# Patient Record
Sex: FEMALE | Race: WHITE | Employment: OTHER | URBAN - METROPOLITAN AREA
[De-identification: names, ages, dates, MRNs, and addresses within clinical notes are randomized per-mention and may not be internally consistent; named-entity substitution may affect disease eponyms.]

---

## 2017-01-16 ENCOUNTER — HOSPITAL ENCOUNTER (OUTPATIENT)
Dept: MAMMOGRAPHY | Age: 67
Discharge: HOME | End: 2017-01-16
Attending: INTERNAL MEDICINE
Payer: MEDICARE

## 2017-01-16 DIAGNOSIS — Z78.0 POSTMENOPAUSAL: ICD-10-CM

## 2017-01-16 PROCEDURE — 77080 DXA BONE DENSITY AXIAL: CPT

## 2017-09-11 ENCOUNTER — HOSPITAL ENCOUNTER (OUTPATIENT)
Dept: PHYSICAL THERAPY | Age: 67
Discharge: HOME | End: 2017-09-11
Attending: INTERNAL MEDICINE
Payer: MEDICARE

## 2017-09-11 PROCEDURE — 97110 THERAPEUTIC EXERCISES: CPT

## 2017-09-11 PROCEDURE — 97166 OT EVAL MOD COMPLEX 45 MIN: CPT

## 2017-09-11 PROCEDURE — G8985 CARRY GOAL STATUS: HCPCS

## 2017-09-11 PROCEDURE — G8984 CARRY CURRENT STATUS: HCPCS

## 2017-09-11 NOTE — PROGRESS NOTES
Ambulatory/Rehab Services H2 Model Falls Risk Assessment    Risk Factor Pts. ·   Confusion/Disorientation/Impulsivity  []    4 ·   Symptomatic Depression  []   2 ·   Altered Elimination  []   1 ·   Dizziness/Vertigo  []   1 ·   Gender (Male)  []   1 ·   Any administered antiepileptics (anticonvulsants):  []   2 ·   Any administered benzodiazepines:  []   1 ·   Visual Impairment (specify):  []   1 ·   Portable Oxygen Use  []   1 ·   Orthostatic ? BP  []   1 ·   History of Recent Falls (within 3 mos.)  []   5     Ability to Rise from Chair (choose one) Pts. ·   Ability to rise in a single movement  []   0 ·   Pushes up, successful in one attempt  []   1 ·   Multiple attempts, but successful  []   3 ·   Unable to rise without assistance  []   4   Total: (5 or greater = High Risk) 0     Falls Prevention Plan:   []                Physical Limitations to Exercise (specify):   []                Mobility Assistance Device (type):   []                Exercise/Equipment Adaptation (specify):    ©2010 [facility] of [address] Patent #[id number].  Federal Law prohibits the replication, distribution or use without written permission from [facility] [employer]

## 2017-09-11 NOTE — PROGRESS NOTES
Reema Andino  : 1950 [facility] at [facility]  [address], [facility], [address]  [phone]   [phone]       Reema Andino  : 1950 Therapy Center at [facility]  [address], [address], [address]  [phone]   [phone]         OUTPATIENT OCCUPATIONAL THERAPY: Initial Assessment and Daily Note 2017    ICD-10: Treatment Diagnosis:   I69.952 Hemiplegia and hemiparesis following unspecified cerebrovascular disease affecting left dominant side       Precautions/Allergies:   Review of patient's allergies indicates no known allergies. Fall Risk Score: 0 (? 5 = High Risk)  MD Orders: OT to address LUE weakness secondary to CVA MEDICAL/REFERRING DIAGNOSIS:   Weakness due to cerebrovascular accident (CVA) ([facility] [address] [I63.9, R53.1]   DATE OF ONSET: 2017   REFERRING PHYSICIAN: Arti Jean Baptiste MD  RETURN PHYSICIAN APPOINTMENT: 2018     INITIAL ASSESSMENT:  Ms. Norma Pulido presents with decreased functional use, strength and range of motion of her left upper extremity that is affecting her independence with activities of daily living and ability to perform job tasks. I feel that Ms. Norma Pulido will benefit from skilled occupational therapy to maximize the functional use of her upper extremity in daily activities and work tasks. PLAN OF CARE:   PROBLEM LIST:  1. Decreased strength in LUE/hand  2. Decreased fine motor coordination in LUE/hand  3. Decreased functional use of LUE for functional tasks INTERVENTIONS PLANNED:  1. Modalities that may include fluidotherapy, paraffin, ultrasound, and light therapy. 2. Therapeutic exercise including a home exercise program.  3. Manual therapy. 4. Therapeutic activities. TREATMENT PLAN:  Effective Dates: 2017 TO 2017.   Frequency/Duration: 2 times a week for 8 weeks  GOALS: (Goals have been discussed and agreed upon with patient.)  Short-Term Functional Goals: Time Frame: 4 weeks  1. Increase LUE strength to 4/5 to allow improved functional use of LUE for tasks such as cutting fabric/gardening  2. Patient to be independent in [facility] for strengthening/coordination of LUE  3. Increase strength in L  by 10 pounds to allow patient to  and lift objects during self care activities. Discharge Goals: Time Frame: 8 weeks  1. Improve DASH rating by 6-8 points indicating overall improved functional use of LUE for self care/homeaking/hobbies  2. Increase motion in L [facility]le [facility] to allow patient to perform all ADL activities. 3. Increase strength in L   by 15 pounds to allow patient to , lift, hold, and carry heavy objects. Rehabilitation Potential For Stated Goals: Excellent  Regarding Brit Caballero's therapy, I certify that the treatment plan above will be carried out by a therapist or under their direction. Thank you for this referral,  Ashley Payne, OT       Referring Physician Signature: Arti Jean Baptiste MD _________________________  Date _________            The information in this section was collected on 9/11/2017 (except where otherwise noted). OCCUPATIONAL PROFILE & HISTORY:   History of Present Injury/Illness (Reason for Referral):  Patient report she had a probable R CVA on 8/29/2017 while on a cruise ship in [place]. She was taken to the [place] [address] and stayed there for 4 days until she returned to the [facility] on 9/2/2017. Since then she reports decreased hand/shoulder strength making it difficult for her to open containers, cut fabric and garden all of which she routinely performs. She has an MRI and US scheduled for 9/21/2017 to confirm CVA. Patient is also set to begin PT to address LLE weakness. Since this incident she uses a straight cane for ambulation and did not use one prior.   Past Medical History/Comorbidities:     Ms. Norma Pulido  has a past medical history of Hypertension (1/8/2013) and Hypothyroidism (1/8/2013). Ms. Reuben Delgado  has a past surgical history that includes hysterectomy; cholecystectomy (2001); and tonsillectomy. Social History/Living Environment:    Patient lives with her  in a 1-story home. She and  travel frequently. Prior Level of Function/Work/Activity:  Retired   Dominant Side:         LEFT  Personal Factors:          Sex:  female        Age:  77 y.o. Current Medications:    Current Outpatient Prescriptions:     benzonatate (TESSALON) 100 mg capsule, Take 1 Cap by mouth three (3) times daily as needed for Cough. , Disp: 30 Cap, Rfl: 2    azithromycin (ZITHROMAX) 250 mg tablet, Take 1 Tab by mouth See Admin Instructions for 5 days. , Disp: 6 Tab, Rfl: 0    albuterol (PROVENTIL HFA, VENTOLIN HFA, PROAIR HFA) 90 mcg/actuation inhaler, Take 2 Puffs by inhalation every four (4) hours as needed for Wheezing (cough). , Disp: 1 Inhaler, Rfl: 0    zolpidem (AMBIEN) 10 mg tablet, Take 1 Tab by mouth nightly as needed. , Disp: 90 Tab, Rfl: 1    SYNTHROID 100 mcg tablet, TAKE 1 TABLET DAILY BEFORE BREAKFAST, Disp: 90 Tab, Rfl: 3    cholecalciferol (VITAMIN D3) 1,000 unit tablet, Take  by mouth daily. , Disp: , Rfl:     raloxifene (EVISTA) 60 mg tablet, TAKE 1 TABLET DAILY, Disp: 90 Tab, Rfl: 3    cyclobenzaprine (FLEXERIL) 10 mg tablet, Take 1 Tab by mouth three (3) times daily as needed for Muscle Spasm(s). , Disp: 30 Tab, Rfl: 1    HYDROcodone-acetaminophen (NORCO) 5-325 mg per tablet, Take 1 Tab by mouth every six (6) hours as needed for Pain. Max Daily Amount: 4 Tabs., Disp: 20 Tab, Rfl: 0    valsartan-hydroCHLOROthiazide (DIOVAN-HCT) 160-25 mg per tablet, TAKE 1 TABLET DAILY, Disp: 90 Tab, Rfl: 3    GLUCOSAMINE HCL/CHONDR PÉREZ A NA (GLUCOSAMINE-CHONDROITIN) 750-600 mg tab, Take  by mouth., Disp: , Rfl:     alclometasone (ACLOVATE) 0.05 % topical cream, Apply  to affected area two (2) times a day.  apply thin layer as directed, Disp: 60 g, Rfl: 0    aspirin 81 mg tablet, Take 81 mg by mouth. Indications: taking 3 tablets. , Disp: , Rfl:     calcium 500 mg Tab, Take  by mouth., Disp: , Rfl:     potassium 99 mg tablet, Take 99 mg by mouth daily as needed. , Disp: , Rfl:    Date Last Reviewed:  9/11/2017   Number of medical conditions (excluding presenting problem) that affect the Plan of Care: Expanded review of therapy/medical records (1-2):  MODERATE COMPLEXITY   ASSESSMENT OF OCCUPATIONAL PERFORMANCE:   RANGE OF MOTION:     · AROM: RUE WFL  ·             LUE WFL except shoulder flexion/ABD to 90 degrees           · PROM: WFL BUE's          STRENGTH:  RUE 4+/5                      LUE shoulder girdle 3-/5, elbow/forearm 4-/5                          R 64#   L 29#                      Tip   R 8#    L 9#                      Lat. Pinch  R 11#    L  4#                      3 jaw amadou   R 12#   L10#      SENSATION:  Intact BUE's    COORDINATION:  9 hole peg test   R 23 seconds    L 22 seconds                              Patient has difficulty opening tight jars, cutting fabric, garden and cut food using L hand. She is otherwise independent in self care skills. Physical Skills Involved:  1. Range of Motion  2. Strength  3. Fine Motor Control Cognitive Skills Affected (resulting in the inability to perform in a timely and safe manner):  1. Psychosocial Skills Affected:  1. Habits/Routines   Number of elements that affect the Plan of Care: 3-5:  MODERATE COMPLEXITY   CLINICAL DECISION MAKING:   Outcome Measure: Tool Used: Disabilities of the Arm, Shoulder and Hand (DASH) Questionnaire - Quick Version  Score:  Initial: 25/55  Most Recent: X/55 (Date: -- )   Interpretation of Score: The DASH is designed to measure the activities of daily living in person's with upper extremity dysfunction or pain. Each section is scored on a 1-5 scale, 5 representing the greatest disability. The scores of each section are added together for a total score of 55.     Score 11 12-19 20-28 29-37 38-45 46-54 55   Modifier CH CI CJ CK CL CM CN     ? Carrying, Moving, and Handling Objects:     - CURRENT STATUS: CJ - 20%-39% impaired, limited or restricted    - GOAL STATUS: CI - 1%-19% impaired, limited or restricted    - D/C STATUS:  ---------------To be determined---------------      Medical Necessity:   · Skilled intervention continues to be required due to LUE weakness as a result of a possible CVA. Reason for Services/Other Comments:  · Patient continues to require skilled intervention due to patient having difficulty completing functional tasks as a result of weakness in L dominant UE secondary to probable CVA. Clinical Decision-Making: MODERATE COMPLEXITY   TREATMENT:   (In addition to Assessment/Re-Assessment sessions the following treatments were rendered)  Pre-treatment Symptoms/Complaints:  LUE weakness and ankle weakness  Pain: Initial: Pain Intensity 1: 1  Post Session:  1:1     OT evaluation completed. Therapeutic exercise (30 minutes):  Patient completed graded clothespins for hand/shoulder strengthening, velcro board, gripper for 20 reps, theraputty and card shuffling for coordination. Patient issued theraputty and gripper for HEP for strengthening/coordination along with fine motor activities (cutting paper, wringing out wash cloth, spray bottle and washing wallls, etc) to all perform daily using LUE. Treatment/Session Assessment:    · Response to Treatment:  Patients tolerated treatment well with no complications. Patient agrees with treatment plan established today and seems eager to see improvements in her condition. · Compliance with Program/Exercises: Will assess as treatment progresses. · Recommendations/Intent for next treatment session: \"Next visit will focus on advancements to more challenging activities\".   Total Treatment Duration:  OT Patient Time In/Time Out  Time In: 0800  Time Out: 0900    Kale Dooley OT

## 2017-09-14 ENCOUNTER — HOSPITAL ENCOUNTER (OUTPATIENT)
Dept: PHYSICAL THERAPY | Age: 67
Discharge: HOME | End: 2017-09-14
Attending: INTERNAL MEDICINE
Payer: MEDICARE

## 2017-09-14 PROCEDURE — 97110 THERAPEUTIC EXERCISES: CPT

## 2017-09-14 PROCEDURE — 97530 THERAPEUTIC ACTIVITIES: CPT

## 2017-09-14 NOTE — PROGRESS NOTES
Ravin Bae  : 1950 Therapy [facility] at [facility]  [address], [facility], [address]  [phone]   [phone]       Ravin Bae  : 1950 [facility] at [facility]  [address], [address], [facility] [address]  [phone]   [phone]         OUTPATIENT OCCUPATIONAL THERAPY: Daily Note 2017    ICD-10: Treatment Diagnosis:   I69.952 Hemiplegia and hemiparesis following unspecified cerebrovascular disease affecting left dominant side       Precautions/Allergies:   Review of patient's allergies indicates no known allergies. Fall Risk Score: 0 (? 5 = High Risk)  MD Orders: OT to address LUE weakness secondary to CVA MEDICAL/REFERRING DIAGNOSIS:   Lymphedema, not elsewhere classified [I89.0]   DATE OF ONSET: 2017   REFERRING PHYSICIAN: Juana Ovalles MD  RETURN PHYSICIAN APPOINTMENT: 2018     INITIAL ASSESSMENT:  Ms. Javier Bertrand presents with decreased functional use, strength and range of motion of her left upper extremity that is affecting her independence with activities of daily living and ability to perform job tasks. I feel that Ms. Javier Bertrand will benefit from skilled occupational therapy to maximize the functional use of her upper extremity in daily activities and work tasks. PLAN OF CARE:   PROBLEM LIST:  1. Decreased strength in LUE/hand  2. Decreased fine motor coordination in LUE/hand  3. Decreased functional use of LUE for functional tasks INTERVENTIONS PLANNED:  1. Modalities that may include fluidotherapy, paraffin, ultrasound, and light therapy. 2. Therapeutic exercise including a home exercise program.  3. Manual therapy. 4. Therapeutic activities. TREATMENT PLAN:  Effective Dates: 2017 TO 2017.   Frequency/Duration: 2 times a week for 8 weeks  GOALS: (Goals have been discussed and agreed upon with patient.)  Short-Term Functional Goals: Time Frame: 4 weeks  1. Increase LUE strength to 4/5 to allow improved functional use of LUE for tasks such as cutting fabric/gardening  2. Patient to be independent in [facility] for strengthening/coordination of LUE  3. Increase strength in L  by 10 pounds to allow patient to  and lift objects during self care activities. Discharge Goals: Time Frame: 8 weeks  1. Improve DASH rating by 6-8 points indicating overall improved functional use of LUE for self care/homeaking/hobbies  2. Increase motion in L shoulder girdle to [facility] to allow patient to perform all ADL activities. 3. Increase strength in L   by 15 pounds to allow patient to , lift, hold, and carry heavy objects. Rehabilitation Potential For Stated Goals: Excellent  Regarding Hamzah Caballero's therapy, I certify that the treatment plan above will be carried out by a therapist or under their direction. Thank you for this referral,  Neel Mcnair OT       Referring Physician Signature: Christ Polanco MD _________________________  Date _________            The information in this section was collected on 9/11/2017 (except where otherwise noted). OCCUPATIONAL PROFILE & HISTORY:   History of Present Injury/Illness (Reason for Referral):  Patient report she had a probable R CVA on 8/29/2017 while on a cruise ship in [place]. She was taken to the [place] [address] and stayed there for 4 days until she returned to the [facility] on 9/2/2017. Since then she reports decreased hand/shoulder strength making it difficult for her to open containers, cut fabric and garden all of which she routinely performs. She has an MRI and US scheduled for 9/21/2017 to confirm CVA. Patient is also set to begin PT to address LLE weakness. Since this incident she uses a straight cane for ambulation and did not use one prior. Past Medical History/Comorbidities:     Ms. Ifeoma Iqbal  has a past medical history of Hypertension (1/8/2013) and Hypothyroidism (1/8/2013).   Ms. Ifeoma Iqbal  has a past surgical history that includes hysterectomy; cholecystectomy (2001); and tonsillectomy. Social History/Living Environment:    Patient lives with her  in a 1-story home. She and  travel frequently. Prior Level of Function/Work/Activity:  Retired   Dominant Side:         LEFT  Personal Factors:          Sex:  female        Age:  77 y.o. Current Medications:    Current Outpatient Prescriptions:     benzonatate (TESSALON) 100 mg capsule, Take 1 Cap by mouth three (3) times daily as needed for Cough. , Disp: 30 Cap, Rfl: 2    albuterol (PROVENTIL HFA, VENTOLIN HFA, PROAIR HFA) 90 mcg/actuation inhaler, Take 2 Puffs by inhalation every four (4) hours as needed for Wheezing (cough). , Disp: 1 Inhaler, Rfl: 0    zolpidem (AMBIEN) 10 mg tablet, Take 1 Tab by mouth nightly as needed. , Disp: 90 Tab, Rfl: 1    SYNTHROID 100 mcg tablet, TAKE 1 TABLET DAILY BEFORE BREAKFAST, Disp: 90 Tab, Rfl: 3    cholecalciferol (VITAMIN D3) 1,000 unit tablet, Take  by mouth daily. , Disp: , Rfl:     raloxifene (EVISTA) 60 mg tablet, TAKE 1 TABLET DAILY, Disp: 90 Tab, Rfl: 3    cyclobenzaprine (FLEXERIL) 10 mg tablet, Take 1 Tab by mouth three (3) times daily as needed for Muscle Spasm(s). , Disp: 30 Tab, Rfl: 1    HYDROcodone-acetaminophen (NORCO) 5-325 mg per tablet, Take 1 Tab by mouth every six (6) hours as needed for Pain. Max Daily Amount: 4 Tabs., Disp: 20 Tab, Rfl: 0    valsartan-hydroCHLOROthiazide (DIOVAN-HCT) 160-25 mg per tablet, TAKE 1 TABLET DAILY, Disp: 90 Tab, Rfl: 3    GLUCOSAMINE HCL/CHONDR PÉREZ A NA (GLUCOSAMINE-CHONDROITIN) 750-600 mg tab, Take  by mouth., Disp: , Rfl:     alclometasone (ACLOVATE) 0.05 % topical cream, Apply  to affected area two (2) times a day. apply thin layer as directed, Disp: 60 g, Rfl: 0    aspirin 81 mg tablet, Take 81 mg by mouth. Indications: taking 3 tablets. , Disp: , Rfl:     calcium 500 mg Tab, Take  by mouth., Disp: , Rfl:     potassium 99 mg tablet, Take 99 mg by mouth daily as needed. , Disp: , Rfl:    Date Last Reviewed:  9/14/2017   Number of medical conditions (excluding presenting problem) that affect the Plan of Care: Expanded review of therapy/medical records (1-2):  MODERATE COMPLEXITY   ASSESSMENT OF OCCUPATIONAL PERFORMANCE:   RANGE OF MOTION:     · AROM: RUE WFL  ·             LUE WFL except shoulder flexion/ABD to 90 degrees           · PROM: WFL BUE's          STRENGTH:  RUE 4+/5                      LUE shoulder girdle 3-/5, elbow/forearm 4-/5                          R 64#   L 29#                      Tip   R 8#    L 9#                      Lat. Pinch  R 11#    L  4#                      3 jaw amadou   R 12#   L10#      SENSATION:  Intact BUE's    COORDINATION:  9 hole peg test   R 23 seconds    L 22 seconds                              Patient has difficulty opening tight jars, cutting fabric, garden and cut food using L hand. She is otherwise independent in self care skills. Physical Skills Involved:  1. Range of Motion  2. Strength  3. Fine Motor Control Cognitive Skills Affected (resulting in the inability to perform in a timely and safe manner):  1. Psychosocial Skills Affected:  1. Habits/Routines   Number of elements that affect the Plan of Care: 3-5:  MODERATE COMPLEXITY   CLINICAL DECISION MAKING:   Outcome Measure: Tool Used: Disabilities of the Arm, Shoulder and Hand (DASH) Questionnaire - Quick Version  Score:  Initial: 25/55  Most Recent: X/55 (Date: -- )   Interpretation of Score: The DASH is designed to measure the activities of daily living in person's with upper extremity dysfunction or pain. Each section is scored on a 1-5 scale, 5 representing the greatest disability. The scores of each section are added together for a total score of 55. Score 11 12-19 20-28 29-37 38-45 46-54 55   Modifier CH CI CJ CK CL CM CN     ?  Carrying, Moving, and Handling Objects:     - CURRENT STATUS: CJ - 20%-39% impaired, limited or restricted    - GOAL STATUS: CI - 1%-19% impaired, limited or restricted    - D/C STATUS:  ---------------To be determined---------------      Medical Necessity:   · Skilled intervention continues to be required due to LUE weakness as a result of a possible CVA. Reason for Services/Other Comments:  · Patient continues to require skilled intervention due to patient having difficulty completing functional tasks as a result of weakness in L dominant UE secondary to probable CVA. Clinical Decision-Making: MODERATE COMPLEXITY   TREATMENT:   (In addition to Assessment/Re-Assessment sessions the following treatments were rendered)  Pre-treatment Symptoms/Complaints:  Pt has been working HEP with theraputty and playing cards. Pain: Initial: Pain Intensity 1: 1  Post Session:  1:1     OT evaluation completed. Therapeutic exercise (45 minutes):  Patient completed supine dowel exercises, 5 reps each x 2 sets with 3 lb weight on dowel:  Shoulder flex/ext, internal/external rotation, horizontal abd/adduction, scapular pro/retraction, forward and back liang large circles. Pron/supination x 20 reps with 2 lb weight, wrist flex/ext, radial/ulnar deviation with 2 lb weight x 15 reps each. Theraputty  Extrinsic and intrinsic strengthening there exercises. Written HEP provided. Therapeutic activity (15 minutes):  Pt completed graded clothespins for loo/shoulder strengthening, overhead reach and isolated pinch. Cutting multiple layers of newspaper strips; with arm extended, utilizing extrinsic hand manipulation to wad up strips and isolate D4 and D5 coordination and strength. Treatment/Session Assessment:    · Response to Treatment:  Patients tolerated treatment well with no complications. Patient agrees with treatment plan established today and seems eager to see improvements in her condition. · Compliance with Program/Exercises: Will assess as treatment progresses.   · Recommendations/Intent for next treatment session: \"Next visit will focus on advancements to more challenging activities\".   Total Treatment Duration:  OT Patient Time In/Time Out  Time In: 0900  Time Out: 119 Octavia Gore, OT

## 2017-09-18 ENCOUNTER — HOSPITAL ENCOUNTER (OUTPATIENT)
Dept: PHYSICAL THERAPY | Age: 67
Discharge: HOME | End: 2017-09-18
Attending: INTERNAL MEDICINE
Payer: MEDICARE

## 2017-09-18 PROCEDURE — 97110 THERAPEUTIC EXERCISES: CPT

## 2017-09-18 NOTE — PROGRESS NOTES
Pacheco Emery  : 1950 Therapy [facility] at [facility]  [address], [facility], [address]  [phone]   [phone]       Pacheco Emery  : 1950 Therapy Center at [facility]  [address] [facility], [address] [place] [address],[address], [facility] [address]  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 Fall Risk Score: 0 (? 5 = High Risk)  MD Orders: OT to address LUE weakness secondary to CVA MEDICAL/REFERRING DIAGNOSIS:   Lymphedema, not elsewhere classified [I89.0]   DATE OF ONSET: 2017   REFERRING PHYSICIAN: Gisselle Sanders MD  RETURN PHYSICIAN APPOINTMENT: 2018     INITIAL ASSESSMENT:  Ms. Ivana Roach presents with decreased functional use, strength and range of motion of her left upper extremity that is affecting her independence with activities of daily living and ability to perform job tasks. I feel that Ms. Ivana Roach will benefit from skilled occupational therapy to maximize the functional use of her upper extremity in daily activities and work tasks. PLAN OF CARE:   PROBLEM LIST:  1. Decreased strength in LUE/hand  2. Decreased fine motor coordination in LUE/hand  3. Decreased functional use of LUE for functional tasks INTERVENTIONS PLANNED:  1. Modalities that may include fluidotherapy, paraffin, ultrasound, and light therapy. 2. Therapeutic exercise including a home exercise program.  3. Manual therapy. 4. Therapeutic activities. TREATMENT PLAN:  Effective Dates: 2017 TO 2017.   Frequency/Duration: 2 times a week for 8 weeks  GOALS: (Goals have been discussed and agreed upon with patient.)  Short-Term Functional Goals: Time Frame: 4 weeks  1. Increase LUE strength to 4/5 to allow improved functional use of LUE for tasks such as cutting fabric/gardening  2. Patient to be independent in [facility] for strengthening/coordination of LUE  3. Increase strength in L  by 10 pounds to allow patient to  and lift objects during self care activities. Discharge Goals: Time Frame: 8 weeks  1. Improve DASH rating by 6-8 points indicating overall improved functional use of LUE for self care/homeaking/hobbies  2. Increase motion in L shoulder girdle to [facility] to allow patient to perform all ADL activities. 3. Increase strength in L   by 15 pounds to allow patient to , lift, hold, and carry heavy objects. Rehabilitation Potential For Stated Goals: Excellent  Regarding Anu Caballero's therapy, I certify that the treatment plan above will be carried out by a therapist or under their direction. Thank you for this referral,  Daniel Tenorio OT       Referring Physician Signature: Nadine Farah MD _________________________  Date _________            The information in this section was collected on 9/11/2017 (except where otherwise noted). OCCUPATIONAL PROFILE & HISTORY:   History of Present Injury/Illness (Reason for Referral):  Patient report she had a probable R CVA on 8/29/2017 while on a cruise ship in [place]. She was taken to the [place] [address] and stayed there for 4 days until she returned to the [facility] on 9/2/2017. Since then she reports decreased hand/shoulder strength making it difficult for her to open containers, cut fabric and garden all of which she routinely performs. She has an MRI and US scheduled for 9/21/2017 to confirm CVA. Patient is also set to begin PT to address LLE weakness. Since this incident she uses a straight cane for ambulation and did not use one prior. Past Medical History/Comorbidities:     Ms. Pop Christensen  has a past medical history of Hypertension (1/8/2013) and Hypothyroidism (1/8/2013).   Ms. Pop Christensen  has a past surgical history that includes hysterectomy; cholecystectomy (2001); and tonsillectomy. Social History/Living Environment:    Patient lives with her  in a 1-story home. She and  travel frequently. Prior Level of Function/Work/Activity:  Retired   Dominant Side:         LEFT  Personal Factors:          Sex:  female        Age:  77 y.o. Current Medications:    Current Outpatient Prescriptions:     benzonatate (TESSALON) 100 mg capsule, Take 1 Cap by mouth three (3) times daily as needed for Cough. , Disp: 30 Cap, Rfl: 2    albuterol (PROVENTIL HFA, VENTOLIN HFA, PROAIR HFA) 90 mcg/actuation inhaler, Take 2 Puffs by inhalation every four (4) hours as needed for Wheezing (cough). , Disp: 1 Inhaler, Rfl: 0    zolpidem (AMBIEN) 10 mg tablet, Take 1 Tab by mouth nightly as needed. , Disp: 90 Tab, Rfl: 1    SYNTHROID 100 mcg tablet, TAKE 1 TABLET DAILY BEFORE BREAKFAST, Disp: 90 Tab, Rfl: 3    cholecalciferol (VITAMIN D3) 1,000 unit tablet, Take  by mouth daily. , Disp: , Rfl:     raloxifene (EVISTA) 60 mg tablet, TAKE 1 TABLET DAILY, Disp: 90 Tab, Rfl: 3    cyclobenzaprine (FLEXERIL) 10 mg tablet, Take 1 Tab by mouth three (3) times daily as needed for Muscle Spasm(s). , Disp: 30 Tab, Rfl: 1    HYDROcodone-acetaminophen (NORCO) 5-325 mg per tablet, Take 1 Tab by mouth every six (6) hours as needed for Pain. Max Daily Amount: 4 Tabs., Disp: 20 Tab, Rfl: 0    valsartan-hydroCHLOROthiazide (DIOVAN-HCT) 160-25 mg per tablet, TAKE 1 TABLET DAILY, Disp: 90 Tab, Rfl: 3    GLUCOSAMINE HCL/CHONDR PÉREZ A NA (GLUCOSAMINE-CHONDROITIN) 750-600 mg tab, Take  by mouth., Disp: , Rfl:     alclometasone (ACLOVATE) 0.05 % topical cream, Apply  to affected area two (2) times a day. apply thin layer as directed, Disp: 60 g, Rfl: 0    aspirin 81 mg tablet, Take 81 mg by mouth. Indications: taking 3 tablets. , Disp: , Rfl:     calcium 500 mg Tab, Take  by mouth., Disp: , Rfl:     potassium 99 mg tablet, Take 99 mg by mouth daily as needed. , Disp: , Rfl:    Date Last Reviewed:  9/18/2017   Number of medical conditions (excluding presenting problem) that affect the Plan of Care: Expanded review of therapy/medical records (1-2):  MODERATE COMPLEXITY   ASSESSMENT OF OCCUPATIONAL PERFORMANCE:   RANGE OF MOTION:     · AROM: RUE WFL  ·             LUE WFL except shoulder flexion/ABD to 90 degrees           · PROM: WFL BUE's          STRENGTH:  RUE 4+/5                      LUE shoulder girdle 3-/5, elbow/forearm 4-/5                          R 64#   L 29#                      Tip   R 8#    L 9#                      Lat. Pinch  R 11#    L  4#                      3 jaw amadou   R 12#   L10#      SENSATION:  Intact BUE's    COORDINATION:  9 hole peg test   R 23 seconds    L 22 seconds                              Patient has difficulty opening tight jars, cutting fabric, garden and cut food using L hand. She is otherwise independent in self care skills. Physical Skills Involved:  1. Range of Motion  2. Strength  3. Fine Motor Control Cognitive Skills Affected (resulting in the inability to perform in a timely and safe manner):  1. Psychosocial Skills Affected:  1. Habits/Routines   Number of elements that affect the Plan of Care: 3-5:  MODERATE COMPLEXITY   CLINICAL DECISION MAKING:   Outcome Measure: Tool Used: Disabilities of the Arm, Shoulder and Hand (DASH) Questionnaire - Quick Version  Score:  Initial: 25/55  Most Recent: X/55 (Date: -- )   Interpretation of Score: The DASH is designed to measure the activities of daily living in person's with upper extremity dysfunction or pain. Each section is scored on a 1-5 scale, 5 representing the greatest disability. The scores of each section are added together for a total score of 55. Score 11 12-19 20-28 29-37 38-45 46-54 55   Modifier CH CI CJ CK CL CM CN     ?  Carrying, Moving, and Handling Objects:     - CURRENT STATUS: CJ - 20%-39% impaired, limited or restricted    - GOAL STATUS: CI - 1%-19% impaired, limited or restricted    - D/C STATUS:  ---------------To be determined---------------      Medical Necessity:   · Skilled intervention continues to be required due to LUE weakness as a result of a possible CVA. Reason for Services/Other Comments:  · Patient continues to require skilled intervention due to patient having difficulty completing functional tasks as a result of weakness in L dominant UE secondary to probable CVA. Clinical Decision-Making: MODERATE COMPLEXITY   TREATMENT:   (In addition to Assessment/Re-Assessment sessions the following treatments were rendered)  Pre-treatment Symptoms/Complaints:  Pt has been working HEP with theraputty and playing cards. She reports it is easier to shuffle cards. Pain: Initial: Pain Intensity 1: 1  Post Session:  1:1     OT evaluation completed. Therapeutic exercise (60 minutes):  Patient completed  Ergometer for 10 minutes with light resistance, dowel exercises in standing, 10 reps each x 2 sets with 3 lb weight stick:  Shoulder flex/ext, internal/external rotation, horizontal abd/adduction, scapular pro/retraction, forward and back liang large circles. Theraputty  Extrinsic and intrinsic strengthening exercises. Green theraband exercises for flexion, biceps/triceps for 10 reps x2 and will perform at home. Graded clothespins and fuse beads for coordination. Patient has fuse beads at home - will do at home as well using tweezer to pick beads up. By end of session LUE was fatgued. Therapeutic activity ( m      Treatment/Session Assessment:    · Response to Treatment:  Patients tolerated treatment well with no complications. Patient agrees with treatment plan established today and seems eager to see improvements in her condition. · Compliance with Program/Exercises: excellent follow through with HEP  · Recommendations/Intent for next treatment session:  \"Next visit will focus on advancements to more challenging activities\".   Total Treatment Duration:  OT Patient Time In/Time Out  Time In: 0800  Time Out: 0900    Nicky Salts, OT

## 2017-09-21 ENCOUNTER — HOSPITAL ENCOUNTER (OUTPATIENT)
Dept: ULTRASOUND IMAGING | Age: 67
Discharge: HOME | End: 2017-09-21
Attending: INTERNAL MEDICINE
Payer: MEDICARE

## 2017-09-21 ENCOUNTER — HOSPITAL ENCOUNTER (OUTPATIENT)
Dept: PHYSICAL THERAPY | Age: 67
Discharge: HOME | End: 2017-09-21
Attending: INTERNAL MEDICINE
Payer: MEDICARE

## 2017-09-21 ENCOUNTER — HOSPITAL ENCOUNTER (OUTPATIENT)
Dept: NON INVASIVE DIAGNOSTICS | Age: 67
Discharge: HOME | End: 2017-09-21
Attending: INTERNAL MEDICINE
Payer: MEDICARE

## 2017-09-21 ENCOUNTER — HOSPITAL ENCOUNTER (OUTPATIENT)
Dept: MRI IMAGING | Age: 67
Discharge: HOME | End: 2017-09-21
Attending: INTERNAL MEDICINE
Payer: MEDICARE

## 2017-09-21 DIAGNOSIS — R09.89 SUSPECTED CEREBROVASCULAR ACCIDENT (CVA): ICD-10-CM

## 2017-09-21 DIAGNOSIS — I10 ESSENTIAL HYPERTENSION: ICD-10-CM

## 2017-09-21 PROCEDURE — 97140 MANUAL THERAPY 1/> REGIONS: CPT

## 2017-09-21 PROCEDURE — 93306 TTE W/DOPPLER COMPLETE: CPT

## 2017-09-21 PROCEDURE — 93880 EXTRACRANIAL BILAT STUDY: CPT

## 2017-09-21 PROCEDURE — 70551 MRI BRAIN STEM W/O DYE: CPT

## 2017-09-21 NOTE — PROGRESS NOTES
Burke Cherry  : 1950 Therapy Center at [facility]  [address], [facility], [address]  [phone]   [phone]       Burke Cherry  : 1950 Therapy Center at [place] [address] [facility], [address] [place] [address],[address], [facility] [address]  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 Fall Risk Score: 0 (? 5 = High Risk)  MD Orders: OT to address LUE weakness secondary to CVA MEDICAL/REFERRING DIAGNOSIS:   Lymphedema, not elsewhere classified [I89.0]   DATE OF ONSET: 2017   REFERRING PHYSICIAN: Chaz Mehta MD  RETURN PHYSICIAN APPOINTMENT: 2018     INITIAL ASSESSMENT:  Ms. Qian Barnett presents with decreased functional use, strength and range of motion of her left upper extremity that is affecting her independence with activities of daily living and ability to perform job tasks. I feel that Ms. Qian Barnett will benefit from skilled occupational therapy to maximize the functional use of her upper extremity in daily activities and work tasks. PLAN OF CARE:   PROBLEM LIST:  1. Decreased strength in LUE/hand  2. Decreased fine motor coordination in LUE/hand  3. Decreased functional use of LUE for functional tasks INTERVENTIONS PLANNED:  1. Modalities that may include fluidotherapy, paraffin, ultrasound, and light therapy. 2. Therapeutic exercise including a home exercise program.  3. Manual therapy. 4. Therapeutic activities. TREATMENT PLAN:  Effective Dates: 2017 TO 2017.   Frequency/Duration: 2 times a week for 8 weeks  GOALS: (Goals have been discussed and agreed upon with patient.)  Short-Term Functional Goals: Time Frame: 4 weeks  1. Increase LUE strength to 4/5 to allow improved functional use of LUE for tasks such as cutting fabric/gardening  2. Patient to be independent in [facility] for strengthening/coordination of LUE  3. Increase strength in L  by 10 pounds to allow patient to  and lift objects during self care activities. Discharge Goals: Time Frame: 8 weeks  1. Improve DASH rating by 6-8 points indicating overall improved functional use of LUE for self care/homeaking/hobbies  2. Increase motion in L shoulder girdle to [facility] to allow patient to perform all ADL activities. 3. Increase strength in L   by 15 pounds to allow patient to , lift, hold, and carry heavy objects. Rehabilitation Potential For Stated Goals: Excellent  Regarding Marcela Caballero's therapy, I certify that the treatment plan above will be carried out by a therapist or under their direction. Thank you for this referral,  Doris Blevins OT       Referring Physician Signature: Nita Pires MD _________________________  Date _________            The information in this section was collected on 9/11/2017 (except where otherwise noted). OCCUPATIONAL PROFILE & HISTORY:   History of Present Injury/Illness (Reason for Referral):  Patient report she had a probable R CVA on 8/29/2017 while on a cruise ship in [place]. She was taken to the [place] [address] and stayed there for 4 days until she returned to the [facility] on 9/2/2017. Since then she reports decreased hand/shoulder strength making it difficult for her to open containers, cut fabric and garden all of which she routinely performs. She has an MRI and US scheduled for 9/21/2017 to confirm CVA. Patient is also set to begin PT to address LLE weakness. Since this incident she uses a straight cane for ambulation and did not use one prior. Past Medical History/Comorbidities:     Ms. Antoine Quiñonez  has a past medical history of Hypertension (1/8/2013) and Hypothyroidism (1/8/2013).   Ms. Antoine Quiñonez  has a past surgical history that includes hysterectomy; cholecystectomy (2001); and tonsillectomy. Social History/Living Environment:    Patient lives with her  in a 1-story home. She and  travel frequently. Prior Level of Function/Work/Activity:  Retired   Dominant Side:         LEFT  Personal Factors:          Sex:  female        Age:  77 y.o. Current Medications:    Current Outpatient Prescriptions:     benzonatate (TESSALON) 100 mg capsule, Take 1 Cap by mouth three (3) times daily as needed for Cough. , Disp: 30 Cap, Rfl: 2    albuterol (PROVENTIL HFA, VENTOLIN HFA, PROAIR HFA) 90 mcg/actuation inhaler, Take 2 Puffs by inhalation every four (4) hours as needed for Wheezing (cough). , Disp: 1 Inhaler, Rfl: 0    zolpidem (AMBIEN) 10 mg tablet, Take 1 Tab by mouth nightly as needed. , Disp: 90 Tab, Rfl: 1    SYNTHROID 100 mcg tablet, TAKE 1 TABLET DAILY BEFORE BREAKFAST, Disp: 90 Tab, Rfl: 3    cholecalciferol (VITAMIN D3) 1,000 unit tablet, Take  by mouth daily. , Disp: , Rfl:     raloxifene (EVISTA) 60 mg tablet, TAKE 1 TABLET DAILY, Disp: 90 Tab, Rfl: 3    cyclobenzaprine (FLEXERIL) 10 mg tablet, Take 1 Tab by mouth three (3) times daily as needed for Muscle Spasm(s). , Disp: 30 Tab, Rfl: 1    HYDROcodone-acetaminophen (NORCO) 5-325 mg per tablet, Take 1 Tab by mouth every six (6) hours as needed for Pain. Max Daily Amount: 4 Tabs., Disp: 20 Tab, Rfl: 0    valsartan-hydroCHLOROthiazide (DIOVAN-HCT) 160-25 mg per tablet, TAKE 1 TABLET DAILY, Disp: 90 Tab, Rfl: 3    GLUCOSAMINE HCL/CHONDR PÉREZ A NA (GLUCOSAMINE-CHONDROITIN) 750-600 mg tab, Take  by mouth., Disp: , Rfl:     alclometasone (ACLOVATE) 0.05 % topical cream, Apply  to affected area two (2) times a day. apply thin layer as directed, Disp: 60 g, Rfl: 0    aspirin 81 mg tablet, Take 81 mg by mouth. Indications: taking 3 tablets. , Disp: , Rfl:     calcium 500 mg Tab, Take  by mouth., Disp: , Rfl:     potassium 99 mg tablet, Take 99 mg by mouth daily as needed. , Disp: , Rfl:    Date Last Reviewed:  9/21/2017   Number of medical conditions (excluding presenting problem) that affect the Plan of Care: Expanded review of therapy/medical records (1-2):  MODERATE COMPLEXITY   ASSESSMENT OF OCCUPATIONAL PERFORMANCE:   RANGE OF MOTION:     · AROM: RUE WFL  ·             LUE WFL except shoulder flexion/ABD to 90 degrees           · PROM: WFL BUE's          STRENGTH:  RUE 4+/5                      LUE shoulder girdle 3-/5, elbow/forearm 4-/5                          R 64#   L 29#                      Tip   R 8#    L 9#                      Lat. Pinch  R 11#    L  4#                      3 jaw amadou   R 12#   L10#      SENSATION:  Intact BUE's    COORDINATION:  9 hole peg test   R 23 seconds    L 22 seconds                              Patient has difficulty opening tight jars, cutting fabric, garden and cut food using L hand. She is otherwise independent in self care skills. Physical Skills Involved:  1. Range of Motion  2. Strength  3. Fine Motor Control Cognitive Skills Affected (resulting in the inability to perform in a timely and safe manner):  1. Psychosocial Skills Affected:  1. Habits/Routines   Number of elements that affect the Plan of Care: 3-5:  MODERATE COMPLEXITY   CLINICAL DECISION MAKING:   Outcome Measure: Tool Used: Disabilities of the Arm, Shoulder and Hand (DASH) Questionnaire - Quick Version  Score:  Initial: 25/55  Most Recent: X/55 (Date: -- )   Interpretation of Score: The DASH is designed to measure the activities of daily living in person's with upper extremity dysfunction or pain. Each section is scored on a 1-5 scale, 5 representing the greatest disability. The scores of each section are added together for a total score of 55. Score 11 12-19 20-28 29-37 38-45 46-54 55   Modifier CH CI CJ CK CL CM CN     ?  Carrying, Moving, and Handling Objects:     - CURRENT STATUS: CJ - 20%-39% impaired, limited or restricted    - GOAL STATUS: CI - 1%-19% impaired, limited or restricted    - D/C STATUS:  ---------------To be determined---------------      Medical Necessity:   · Skilled intervention continues to be required due to LUE weakness as a result of a possible CVA. Reason for Services/Other Comments:  · Patient continues to require skilled intervention due to patient having difficulty completing functional tasks as a result of weakness in L dominant UE secondary to probable CVA. Clinical Decision-Making: MODERATE COMPLEXITY   TREATMENT:   (In addition to Assessment/Re-Assessment sessions the following treatments were rendered)  Pre-treatment Symptoms/Complaints:  Pt is performing HEP daily. She reports she weeded her garden yesterday and was quite fatigued afterward. Pain: Initial: Pain Intensity 1: 1  Post Session:  1:1     OT evaluation completed. Therapeutic exercise (60 minutes):  Patient completed  Ergometer for 10 minutes with light resistance, dowel exercises in standing, 20 reps each x 3 sets with 3 lb weight stick:  Shoulder flex/ext, internal/external rotation, horizontal abd/adduction, scapular pro/retraction, forward and back liang large circles. Theraputty  Extrinsic and intrinsic strengthening exercises. Graded clothespins, paper clip activity, velcro board and stringing beads for coordination. Strength measured and improved as follows:  L  56#, L tip  Pinch 11#, L lateral pinch 13# & L 3 jaaw amadou 17#. Therapeutic activity ( m      Treatment/Session Assessment:    · Response to Treatment:  Patients tolerated treatment well with no complications. Patient is improving in L strength -- see TE section above. She begins PT next week and has an MRI this afternoon. · Compliance with Program/Exercises: excellent follow through with HEP  · Recommendations/Intent for next treatment session: \"Next visit will focus on advancements to more challenging activities\".   Total Treatment Duration:  OT Patient Time In/Time Out  Time In: 0900  Time Out: [id number] Janice Mcknight OT

## 2017-09-25 ENCOUNTER — HOSPITAL ENCOUNTER (OUTPATIENT)
Dept: PHYSICAL THERAPY | Age: 67
Discharge: HOME | End: 2017-09-25
Payer: MEDICARE

## 2017-09-25 ENCOUNTER — HOSPITAL ENCOUNTER (OUTPATIENT)
Dept: PHYSICAL THERAPY | Age: 67
Discharge: HOME | End: 2017-09-25
Attending: INTERNAL MEDICINE
Payer: MEDICARE

## 2017-09-25 PROCEDURE — G8978 MOBILITY CURRENT STATUS: HCPCS

## 2017-09-25 PROCEDURE — G8979 MOBILITY GOAL STATUS: HCPCS

## 2017-09-25 PROCEDURE — 97110 THERAPEUTIC EXERCISES: CPT

## 2017-09-25 PROCEDURE — 97162 PT EVAL MOD COMPLEX 30 MIN: CPT

## 2017-09-25 NOTE — PROGRESS NOTES
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 Fall Risk Score: 0 (? 5 = High Risk)  MD Orders: OT to address LUE weakness secondary to CVA MEDICAL/REFERRING DIAGNOSIS:   Lymphedema, not elsewhere classified [I89.0]   DATE OF ONSET: 2017   REFERRING PHYSICIAN: Josi Bustillo MD  RETURN PHYSICIAN APPOINTMENT: 2018     INITIAL ASSESSMENT:  Ms. Ramakrishna Davis presents with decreased functional use, strength and range of motion of her left upper extremity that is affecting her independence with activities of daily living and ability to perform job tasks. I feel that Ms. Ramakrishna Davis will benefit from skilled occupational therapy to maximize the functional use of her upper extremity in daily activities and work tasks. PLAN OF CARE:   PROBLEM LIST:  1. Decreased strength in LUE/hand  2. Decreased fine motor coordination in LUE/hand  3. Decreased functional use of LUE for functional tasks INTERVENTIONS PLANNED:  1. Modalities that may include fluidotherapy, paraffin, ultrasound, and light therapy. 2. Therapeutic exercise including a home exercise program.  3. Manual therapy. 4. Therapeutic activities. TREATMENT PLAN:  Effective Dates: 2017 TO 2017.   Frequency/Duration: 2 times a week for 8 weeks  GOALS: (Goals have been discussed and agreed upon with patient.)  Short-Term Functional Goals: Time Frame: 4 weeks  1. Increase LUE strength to 4/5 to allow improved functional use of LUE for tasks such as cutting fabric/gardening  2. Patient to be independent in [facility] for strengthening/coordination of LUE  3. Increase strength in L  by 10 pounds to allow patient to  and lift objects during self care activities. Discharge Goals: Time Frame: 8 weeks  1. Improve DASH rating by 6-8 points indicating overall improved functional use of LUE for self care/homeaking/hobbies  2. Increase motion in L shoulder girdle to [facility] to allow patient to perform all ADL activities. 3. Increase strength in L   by 15 pounds to allow patient to , lift, hold, and carry heavy objects. Rehabilitation Potential For Stated Goals: Excellent  Regarding Xenia Caballero's therapy, I certify that the treatment plan above will be carried out by a therapist or under their direction. Thank you for this referral,  Daniel Tenorio OT       Referring Physician Signature: Nadine Farah MD _________________________  Date _________            The information in this section was collected on 9/11/2017 (except where otherwise noted). OCCUPATIONAL PROFILE & HISTORY:   History of Present Injury/Illness (Reason for Referral):  Patient report she had a probable R CVA on 8/29/2017 while on a cruise ship in [place]. She was taken to the [place] [address] and stayed there for 4 days until she returned to the [facility] on 9/2/2017. Since then she reports decreased hand/shoulder strength making it difficult for her to open containers, cut fabric and garden all of which she routinely performs. She has an MRI and US scheduled for 9/21/2017 to confirm CVA. Patient is also set to begin PT to address LLE weakness. Since this incident she uses a straight cane for ambulation and did not use one prior. Past Medical History/Comorbidities:     Ms. Pop Christensen  has a past medical history of Hypertension (1/8/2013) and Hypothyroidism (1/8/2013).   Ms. Pop Christensen  has a past surgical history that includes hysterectomy; cholecystectomy (2001); and tonsillectomy. Social History/Living Environment:    Patient lives with her  in a 1-story home. She and  travel frequently. Prior Level of Function/Work/Activity:  Retired   Dominant Side:         LEFT  Personal Factors:          Sex:  female        Age:  77 y.o. Current Medications:    Current Outpatient Prescriptions:     benzonatate (TESSALON) 100 mg capsule, Take 1 Cap by mouth three (3) times daily as needed for Cough. , Disp: 30 Cap, Rfl: 2    albuterol (PROVENTIL HFA, VENTOLIN HFA, PROAIR HFA) 90 mcg/actuation inhaler, Take 2 Puffs by inhalation every four (4) hours as needed for Wheezing (cough). , Disp: 1 Inhaler, Rfl: 0    zolpidem (AMBIEN) 10 mg tablet, Take 1 Tab by mouth nightly as needed. , Disp: 90 Tab, Rfl: 1    SYNTHROID 100 mcg tablet, TAKE 1 TABLET DAILY BEFORE BREAKFAST, Disp: 90 Tab, Rfl: 3    cholecalciferol (VITAMIN D3) 1,000 unit tablet, Take  by mouth daily. , Disp: , Rfl:     raloxifene (EVISTA) 60 mg tablet, TAKE 1 TABLET DAILY, Disp: 90 Tab, Rfl: 3    cyclobenzaprine (FLEXERIL) 10 mg tablet, Take 1 Tab by mouth three (3) times daily as needed for Muscle Spasm(s). , Disp: 30 Tab, Rfl: 1    HYDROcodone-acetaminophen (NORCO) 5-325 mg per tablet, Take 1 Tab by mouth every six (6) hours as needed for Pain. Max Daily Amount: 4 Tabs., Disp: 20 Tab, Rfl: 0    valsartan-hydroCHLOROthiazide (DIOVAN-HCT) 160-25 mg per tablet, TAKE 1 TABLET DAILY, Disp: 90 Tab, Rfl: 3    GLUCOSAMINE HCL/CHONDR PÉREZ A NA (GLUCOSAMINE-CHONDROITIN) 750-600 mg tab, Take  by mouth., Disp: , Rfl:     alclometasone (ACLOVATE) 0.05 % topical cream, Apply  to affected area two (2) times a day. apply thin layer as directed, Disp: 60 g, Rfl: 0    aspirin 81 mg tablet, Take 81 mg by mouth. Indications: taking 3 tablets. , Disp: , Rfl:     calcium 500 mg Tab, Take  by mouth., Disp: , Rfl:     potassium 99 mg tablet, Take 99 mg by mouth daily as needed. , Disp: , Rfl:    Date Last Reviewed:  9/25/2017   Number of medical conditions (excluding presenting problem) that affect the Plan of Care: Expanded review of therapy/medical records (1-2):  MODERATE COMPLEXITY   ASSESSMENT OF OCCUPATIONAL PERFORMANCE:   RANGE OF MOTION:     · AROM: RUE WFL  ·             LUE WFL except shoulder flexion/ABD to 90 degrees           · PROM: WFL BUE's          STRENGTH:  RUE 4+/5                      LUE shoulder girdle 3-/5, elbow/forearm 4-/5                          R 64#   L 29#                      Tip   R 8#    L 9#                      Lat. Pinch  R 11#    L  4#                      3 jaw amadou   R 12#   L10#      SENSATION:  Intact BUE's    COORDINATION:  9 hole peg test   R 23 seconds    L 22 seconds                              Patient has difficulty opening tight jars, cutting fabric, garden and cut food using L hand. She is otherwise independent in self care skills. Physical Skills Involved:  1. Range of Motion  2. Strength  3. Fine Motor Control Cognitive Skills Affected (resulting in the inability to perform in a timely and safe manner):  1. Psychosocial Skills Affected:  1. Habits/Routines   Number of elements that affect the Plan of Care: 3-5:  MODERATE COMPLEXITY   CLINICAL DECISION MAKING:   Outcome Measure: Tool Used: Disabilities of the Arm, Shoulder and Hand (DASH) Questionnaire - Quick Version  Score:  Initial: 25/55  Most Recent: X/55 (Date: -- )   Interpretation of Score: The DASH is designed to measure the activities of daily living in person's with upper extremity dysfunction or pain. Each section is scored on a 1-5 scale, 5 representing the greatest disability. The scores of each section are added together for a total score of 55. Score 11 12-19 20-28 29-37 38-45 46-54 55   Modifier CH CI CJ CK CL CM CN     ?  Carrying, Moving, and Handling Objects:     - CURRENT STATUS: CJ - 20%-39% impaired, limited or restricted    - GOAL STATUS: CI - 1%-19% impaired, limited or restricted    - D/C STATUS:  ---------------To be determined---------------      Medical Necessity:   · Skilled intervention continues to be required due to LUE weakness as a result of a possible CVA. Reason for Services/Other Comments:  · Patient continues to require skilled intervention due to patient having difficulty completing functional tasks as a result of weakness in L dominant UE secondary to probable CVA. Clinical Decision-Making: MODERATE COMPLEXITY   TREATMENT:   (In addition to Assessment/Re-Assessment sessions the following treatments were rendered)  Pre-treatment Symptoms/Complaints:  Pt is performing HEP daily. She reports she her MRI last week. She is awaiting results. Patient began PT today. She has difficulty with small  Fasteners and LUE fatigues easily. Pain: Initial: Pain Intensity 1: 1  Post Session:  1:1     OT evaluation completed. Therapeutic exercise (60 minutes):  Patient completed  Ergometer for 10 minutes with light resistance, dowel exercises in standing, 20 reps each x 3 sets with 3 lb weight stick:  Shoulder flex/ext, internal/external rotation, horizontal abd/adduction, scapular pro/retraction, forward and back liang large circles. Theraputty  Extrinsic and intrinsic strengthening exercises. Graded clothespins, paper clip activity, velcro board and stringing beads for coordination. Strength measured and improved as follows:  L  56#, L tip  Pinch 11#, L lateral pinch 13# & L 3 jaaw amadou 17#. Reviewed importance of pacing herself when completing tasks to decrease level of fatigue with LUE. Therapeutic activity ( m      Treatment/Session Assessment:    · Response to Treatment:  Patients tolerated treatment well with no complications. Patient is improving in L strength -- see TE section above. She began PT this morning .    · Compliance with Program/Exercises: excellent follow through with HEP  · Recommendations/Intent for next treatment session: \"Next visit will focus on advancements to more challenging activities\".   Total Treatment Duration:  OT Patient Time In/Time Out  Time In: 0900  Time Out: 96769 Janice Mcknight OT

## 2017-09-25 NOTE — PROGRESS NOTES
Ambulatory/Rehab Services H2 Model Falls Risk Assessment    Risk Factor Pts. ·   Confusion/Disorientation/Impulsivity  []    4 ·   Symptomatic Depression  []   2 ·   Altered Elimination  []   1 ·   Dizziness/Vertigo  []   1 ·   Gender (Male)  []   1 ·   Any administered antiepileptics (anticonvulsants):  []   2 ·   Any administered benzodiazepines:  []   1 ·   Visual Impairment (specify):  []   1 ·   Portable Oxygen Use  []   1 ·   Orthostatic ? BP  []   1 ·   History of Recent Falls (within 3 mos.)  []   5     Ability to Rise from Chair (choose one) Pts. ·   Ability to rise in a single movement  []   0 ·   Pushes up, successful in one attempt  [x]   1 ·   Multiple attempts, but successful  []   3 ·   Unable to rise without assistance  []   4   Total: (5 or greater = High Risk) 1     Falls Prevention Plan:   []                Physical Limitations to Exercise (specify):   []                Mobility Assistance Device (type):   []                Exercise/Equipment Adaptation (specify):    ©2010 [facility] [place] Patent #[id number].  Federal Law prohibits the replication, distribution or use without written permission from [facility] [employer]

## 2017-09-26 NOTE — PROGRESS NOTES
All tests now back. MRI does not show stroke. This does not mean you did not have event, but that there was no large permanent brain damage. Carotid and heart ultrasound also look ok. No obvious cause for the stroke. This is good, but also a bit frustrating. There is evidence that certain BP and cholesterol medications can reduce the risk of a second stroke. Also, changing from aspirin to Plavix is shown to reduce stroke risk. If you are willing to add these medications, I will send them to your pharmacy. We would start with low doses that should not cause any side effects. Let me know what questions and concerns you have and how you would like to proceed.

## 2017-09-28 ENCOUNTER — HOSPITAL ENCOUNTER (OUTPATIENT)
Dept: PHYSICAL THERAPY | Age: 67
Discharge: HOME | End: 2017-09-28
Payer: MEDICARE

## 2017-09-28 ENCOUNTER — HOSPITAL ENCOUNTER (OUTPATIENT)
Dept: PHYSICAL THERAPY | Age: 67
Discharge: HOME | End: 2017-09-28
Attending: INTERNAL MEDICINE
Payer: MEDICARE

## 2017-09-28 PROCEDURE — 97530 THERAPEUTIC ACTIVITIES: CPT

## 2017-09-28 PROCEDURE — 97110 THERAPEUTIC EXERCISES: CPT

## 2017-09-28 NOTE — PROGRESS NOTES
Ferraro Emma  : 1950 [facility] at [facility]  [address], [facility], [address]  [phone]   [phone]       OUTPATIENT PHYSICAL THERAPY:Daily Note 2017    ICD-10: Treatment Diagnosis:   G81.94 Hemiplegia, unspecified affecting left nondominant side   Precautions/Allergies:   Review of patient's allergies indicates no known allergies. Fall Risk Score: 1 (? 5 = High Risk)  MD Orders: Evaluate and treat MEDICAL/REFERRING DIAGNOSIS:  Cerebral infarction, unspecified [I63.9]  Weakness [R53.1]   DATE OF ONSET: 2017  REFERRING PHYSICIAN: Harmony Ryan MD  RETURN PHYSICIAN APPOINTMENT: WILVER     INITIAL ASSESSMENT:  Ms. Randee Last reported an onset of L UE and LE hemiparesis on 2017 while on a cruise in the  [facility]. She returned home an reported a recent MRI she is awaiting for results. She is ambulating with a cane and L foot drop and reports she fatigues easily with prolonged activity. She was referred to physical therapy for care. PROBLEM LIST (Impacting functional limitations):  1. Decreased Strength  2. Decreased Ambulation Ability/Technique  3. Decreased Activity Tolerance INTERVENTIONS PLANNED:  1. Neuromuscular Re-education/Strengthening  2. Range of Motion (ROM)  3. Therapeutic Exercise/Strengthening   TREATMENT PLAN:  Effective Dates: 2017 TO 2017. Frequency/Duration: 2 times a week for 8 weeks  GOALS: (Goals have been discussed and agreed upon with patient.)  Short-Term Functional Goals: Time Frame: 4 weeks. 1. Increase LEFS 4 points to decrease fall risk. 2. Increase dorsiflexion 5 degrees to decrease fall risk. 3. Increase L single leg balance 1-2 seconds to decrease fall risk. Discharge Goals: Time Frame: 8 weeks. 1. INcrease LEFS 9 points to decrease fall risk. 2. Increase dorsiflexion 10 degrees to decrease fall risk.    3. Demonstrate independence in home exercises to allow DC from physical therapy. Rehabilitation Potential For Stated Goals: Good              The information in this section was collected on 9/25/2017 (except where otherwise noted). HISTORY:   History of Present Injury/Illness (Reason for Referral): The patient reported an onset of L UE and LE hemiparesis on 8/29/2017 while on a cruise in the [facility]. She returned home an reported a recent MRI she is awaiting for results. She is ambulating with a cane and L foot drop and reports she fatigues easily with prolonged activity. She was referred to physical therapy for care. Past Medical History/Comorbidities:   Ms. Angela Canada  has a past medical history of Hypertension (1/8/2013) and Hypothyroidism (1/8/2013). Ms. Angela Canada  has a past surgical history that includes hysterectomy; cholecystectomy (2001); and tonsillectomy. Social History/Living Environment:    Independent,   Prior Level of Function/Work/Activity:  Retired  Dominant Side:         RIGHT    Current Medications:       Current Outpatient Prescriptions:     clopidogrel (PLAVIX) 75 mg tab, Take 1 Tab by mouth daily. This replaces any daily ASA., Disp: 90 Tab, Rfl: 3    benzonatate (TESSALON) 100 mg capsule, Take 1 Cap by mouth three (3) times daily as needed for Cough. , Disp: 30 Cap, Rfl: 2    albuterol (PROVENTIL HFA, VENTOLIN HFA, PROAIR HFA) 90 mcg/actuation inhaler, Take 2 Puffs by inhalation every four (4) hours as needed for Wheezing (cough). , Disp: 1 Inhaler, Rfl: 0    zolpidem (AMBIEN) 10 mg tablet, Take 1 Tab by mouth nightly as needed. , Disp: 90 Tab, Rfl: 1    SYNTHROID 100 mcg tablet, TAKE 1 TABLET DAILY BEFORE BREAKFAST, Disp: 90 Tab, Rfl: 3    cholecalciferol (VITAMIN D3) 1,000 unit tablet, Take  by mouth daily. , Disp: , Rfl:     raloxifene (EVISTA) 60 mg tablet, TAKE 1 TABLET DAILY, Disp: 90 Tab, Rfl: 3    cyclobenzaprine (FLEXERIL) 10 mg tablet, Take 1 Tab by mouth three (3) times daily as needed for Muscle Spasm(s). , Disp: 30 Tab, Rfl: 1   HYDROcodone-acetaminophen (NORCO) 5-325 mg per tablet, Take 1 Tab by mouth every six (6) hours as needed for Pain. Max Daily Amount: 4 Tabs., Disp: 20 Tab, Rfl: 0    valsartan-hydroCHLOROthiazide (DIOVAN-HCT) 160-25 mg per tablet, TAKE 1 TABLET DAILY, Disp: 90 Tab, Rfl: 3    GLUCOSAMINE HCL/CHONDR PÉREZ A NA (GLUCOSAMINE-CHONDROITIN) 750-600 mg tab, Take  by mouth., Disp: , Rfl:     alclometasone (ACLOVATE) 0.05 % topical cream, Apply  to affected area two (2) times a day. apply thin layer as directed, Disp: 60 g, Rfl: 0    aspirin 81 mg tablet, Take 81 mg by mouth. Indications: taking 3 tablets. , Disp: , Rfl:     calcium 500 mg Tab, Take  by mouth., Disp: , Rfl:     potassium 99 mg tablet, Take 99 mg by mouth daily as needed. , Disp: , Rfl:    Date Last Reviewed:  9/28/2017   Number of Personal Factors/Comorbidities that affect the Plan of Care: 1-2: MODERATE COMPLEXITY   EXAMINATION:   SINGLE LEG BALANCE:  R  +10     seconds,   L  2    seconds  Observation/Orthostatic Postural Assessment:   L LE limp with a L foot drop. Palpation:   No specific c/o of pain. ROM: PROM : ANKLE   90 degrees dorsiflexion bilaterally (9/25)                       Strength:    +3/5 L LE Strength   +4/5 R LE Strength           Special Tests: N/A  Neurological Screen: N/A  Functional Mobility: Independent with cane. Balance:  Fair. Body Structures Involved:  1. Muscles Body Functions Affected:  1. Sensory/Pain  2. Neuromusculoskeletal  3. Movement Related Activities and Participation Affected:  1. General Tasks and Demands  2. Mobility  3. Community, Social and [place]   Number of elements (examined above) that affect the Plan of Care: 3: MODERATE COMPLEXITY   CLINICAL PRESENTATION:   Presentation: Evolving clinical presentation with changing clinical characteristics: MODERATE COMPLEXITY   CLINICAL DECISION MAKING:   Outcome Measure:    Tool Used: Lower Extremity Functional Scale (LEFS)  Score:  Initial: 49/80 Most Recent: X/80 (Date: -- )   Interpretation of Score: 20 questions each scored on a 5 point scale with 0 representing \"extreme difficulty or unable to perform\" and 4 representing \"no difficulty\". The lower the score, the greater the functional disability. 80/80 represents no disability. Minimal detectable change is 9 points. Score 80 79-63 62-48 47-32 31-16 15-1 0   Modifier CH CI CJ CK CL CM CN     ? Mobility - Walking and Moving Around:     - CURRENT STATUS: CJ - 20%-39% impaired, limited or restricted    - GOAL STATUS: CI - 1%-19% impaired, limited or restricted    - D/C STATUS:  ---------------To be determined---------------      Medical Necessity:   · Patient demonstrates good rehab potential due to higher previous functional level. Reason for Services/Other Comments:  · Patient continues to require skilled intervention due to increase L side LE strength to allow safe weight bearing ADLs and decrease fall risk. .   Use of outcome tool(s) and clinical judgement create a POC that gives a: Questionable prediction of patient's progress: MODERATE COMPLEXITY            TREATMENT:   (In addition to Assessment/Re-Assessment sessions the following treatments were rendered)  Pre-treatment Symptoms/Complaints: The patient reported the results of her MRI did not reveal a clot nor insult. Pain: Initial: Pain Intensity 1: 1  Post Session:  1   Therapeutic Exercise: ( 60): The patient received treatment as below to improve mobility, strength and balance. Required moderate verbal and manual cues to promote proper body alignment, promote proper body posture and promote proper body mechanics. Progressed resistance, range and repetitions as indicated. The patient received exercise instruction followed by patient demonstration of the exercises as below to decrease strength and AROM deficits, improve balance and ambulatory skills and decrease fall risk.   [employer] Portal  Evaluation: (  )   Date:  9/25/2017 Date:  9/28/2017 Date:     Activity/Exercise Parameters Parameters Parameters   MAT  EXERCISES:      Hip ER / Abduction  2 x 10    Hip Adduction (Ball Squeeze)      Bridging (Single Leg)  2 x 10    Straight Leg Raise      SAQ      Hip Abduction (Sidelying)      Gluteus Medius (Clam Shell)      Sit To Stand            STANDING HIP MACHINE:      Hip Abduction [email or web address] x 10(B) [email or web address] x 10(B)    Hip Flexion [email or web address] x 10(B) [email or web address] x 10(B)    Hip Extension [email or web address] x 10(B) [email or web address] x 10(B)    Hip Adduction      Standing Knee Extension      4 Way Cable Walkout      Gluteus Medius (Crab Walk)      Knee Extension [email or web address] x 10(B) [email or web address] x 10(B)    Knee Flexion [email or web address] x 10(B) [email or web address] x 10(B)    Step Downs  2 x 10(B)          Stationary Bike      Treadmill      NuStep  10 minutes          FLEXIBILITY:      Heel Cord 3 min 3 min    Hamstring      Hip Flexors              Manual Therapy (     ):   Therapeutic Modalities:                                                                                               HEP: As above; handouts given to patient for all exercises. Treatment/Session Assessment:    · Response to Treatment:  The patient tolerated today's treatment without symptom exacerbation. The patient demonstrated good exercise tolerance to the exercises and required manual and verbal cuing for proper technique. The patient should progress to an independent home exercise program within a few weeks. · Compliance with Program/Exercises: compliant all of the time. · Recommendations/Intent for next treatment session: \"Next visit will focus on advancements to more challenging activities\".   Total Treatment Duration:   0900   30 [facility] PT

## 2017-09-28 NOTE — PROGRESS NOTES
Hermelinda Marsh  : 1950 [facility] at [facility] Therapy  [address], [facility], [address]  [phone]   [phone]       Hermelinda Marsh  : 1950 [facility] at [place] [address] [facility], [address] [place] [address],[address], [facility] [address]  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 Fall Risk Score: 0 (? 5 = High Risk)  MD Orders: OT to address LUE weakness secondary to CVA MEDICAL/REFERRING DIAGNOSIS:   Lymphedema, not elsewhere classified [I89.0]   DATE OF ONSET: 2017   REFERRING PHYSICIAN: Marge Sanchez MD  RETURN PHYSICIAN APPOINTMENT: 2018     INITIAL ASSESSMENT:  Ms. Trish Velasco presents with decreased functional use, strength and range of motion of her left upper extremity that is affecting her independence with activities of daily living and ability to perform job tasks. I feel that Ms. Trish Velasco will benefit from skilled occupational therapy to maximize the functional use of her upper extremity in daily activities and work tasks. PLAN OF CARE:   PROBLEM LIST:  1. Decreased strength in LUE/hand  2. Decreased fine motor coordination in LUE/hand  3. Decreased functional use of LUE for functional tasks INTERVENTIONS PLANNED:  1. Modalities that may include fluidotherapy, paraffin, ultrasound, and light therapy. 2. Therapeutic exercise including a home exercise program.  3. Manual therapy. 4. Therapeutic activities. TREATMENT PLAN:  Effective Dates: 2017 TO 2017.   Frequency/Duration: 2 times a week for 8 weeks  GOALS: (Goals have been discussed and agreed upon with patient.)  Short-Term Functional Goals: Time Frame: 4 weeks  1. Increase LUE strength to 4/5 to allow improved functional use of LUE for tasks such as cutting fabric/gardening  2. Patient to be independent in [facility] for strengthening/coordination of LUE  3. Increase strength in L  by 10 pounds to allow patient to  and lift objects during self care activities. Discharge Goals: Time Frame: 8 weeks  1. Improve DASH rating by 6-8 points indicating overall improved functional use of LUE for self care/homeaking/hobbies  2. Increase motion in L shoulder girdle to [facility] to allow patient to perform all ADL activities. 3. Increase strength in L   by 15 pounds to allow patient to , lift, hold, and carry heavy objects. Rehabilitation Potential For Stated Goals: Excellent  Regarding Dana Caballero's therapy, I certify that the treatment plan above will be carried out by a therapist or under their direction. Thank you for this referral,  Idalia Arreola OT       Referring Physician Signature: Jose Gale MD _________________________  Date _________            The information in this section was collected on 9/11/2017 (except where otherwise noted). OCCUPATIONAL PROFILE & HISTORY:   History of Present Injury/Illness (Reason for Referral):  Patient report she had a probable R CVA on 8/29/2017 while on a cruise ship [place]. She was taken to the [place] [address] and stayed there for 4 days until she returned to the [facility] on 9/2/2017. Since then she reports decreased hand/shoulder strength making it difficult for her to open containers, cut fabric and garden all of which she routinely performs. She has an MRI and US scheduled for 9/21/2017 to confirm CVA. Patient is also set to begin PT to address LLE weakness. Since this incident she uses a straight cane for ambulation and did not use one prior. Past Medical History/Comorbidities:     Ms. Suraj Quiñones  has a past medical history of Hypertension (1/8/2013) and Hypothyroidism (1/8/2013).   Ms. Suraj Quiñones  has a past surgical history that includes hysterectomy; cholecystectomy (2001); and tonsillectomy. Social History/Living Environment:    Patient lives with her  in a 1-story home. She and  travel frequently. Prior Level of Function/Work/Activity:  Retired   Dominant Side:         LEFT  Personal Factors:          Sex:  female        Age:  77 y.o. Current Medications:    Current Outpatient Prescriptions:     clopidogrel (PLAVIX) 75 mg tab, Take 1 Tab by mouth daily. This replaces any daily ASA., Disp: 90 Tab, Rfl: 3    benzonatate (TESSALON) 100 mg capsule, Take 1 Cap by mouth three (3) times daily as needed for Cough. , Disp: 30 Cap, Rfl: 2    albuterol (PROVENTIL HFA, VENTOLIN HFA, PROAIR HFA) 90 mcg/actuation inhaler, Take 2 Puffs by inhalation every four (4) hours as needed for Wheezing (cough). , Disp: 1 Inhaler, Rfl: 0    zolpidem (AMBIEN) 10 mg tablet, Take 1 Tab by mouth nightly as needed. , Disp: 90 Tab, Rfl: 1    SYNTHROID 100 mcg tablet, TAKE 1 TABLET DAILY BEFORE BREAKFAST, Disp: 90 Tab, Rfl: 3    cholecalciferol (VITAMIN D3) 1,000 unit tablet, Take  by mouth daily. , Disp: , Rfl:     raloxifene (EVISTA) 60 mg tablet, TAKE 1 TABLET DAILY, Disp: 90 Tab, Rfl: 3    cyclobenzaprine (FLEXERIL) 10 mg tablet, Take 1 Tab by mouth three (3) times daily as needed for Muscle Spasm(s). , Disp: 30 Tab, Rfl: 1    HYDROcodone-acetaminophen (NORCO) 5-325 mg per tablet, Take 1 Tab by mouth every six (6) hours as needed for Pain. Max Daily Amount: 4 Tabs., Disp: 20 Tab, Rfl: 0    valsartan-hydroCHLOROthiazide (DIOVAN-HCT) 160-25 mg per tablet, TAKE 1 TABLET DAILY, Disp: 90 Tab, Rfl: 3    GLUCOSAMINE HCL/CHONDR PÉREZ A NA (GLUCOSAMINE-CHONDROITIN) 750-600 mg tab, Take  by mouth., Disp: , Rfl:     alclometasone (ACLOVATE) 0.05 % topical cream, Apply  to affected area two (2) times a day. apply thin layer as directed, Disp: 60 g, Rfl: 0    aspirin 81 mg tablet, Take 81 mg by mouth.  Indications: taking 3 tablets. , Disp: , Rfl:     calcium 500 mg Tab, Take  by mouth., Disp: , Rfl:     potassium 99 mg tablet, Take 99 mg by mouth daily as needed. , Disp: , Rfl:    Date Last Reviewed:  9/28/2017   Number of medical conditions (excluding presenting problem) that affect the Plan of Care: Expanded review of therapy/medical records (1-2):  MODERATE COMPLEXITY   ASSESSMENT OF OCCUPATIONAL PERFORMANCE:   RANGE OF MOTION:     · AROM: RUE WFL  ·             LUE WFL except shoulder flexion/ABD to 90 degrees           · PROM: WFL BUE's          STRENGTH:  RUE 4+/5                      LUE shoulder girdle 3-/5, elbow/forearm 4-/5                          R 64#   L 29#                      Tip   R 8#    L 9#                      Lat. Pinch  R 11#    L  4#                      3 jaw amadou   R 12#   L10#      SENSATION:  Intact BUE's    COORDINATION:  9 hole peg test   R 23 seconds    L 22 seconds                              Patient has difficulty opening tight jars, cutting fabric, garden and cut food using L hand. She is otherwise independent in self care skills. Physical Skills Involved:  1. Range of Motion  2. Strength  3. Fine Motor Control Cognitive Skills Affected (resulting in the inability to perform in a timely and safe manner):  1. Psychosocial Skills Affected:  1. Habits/Routines   Number of elements that affect the Plan of Care: 3-5:  MODERATE COMPLEXITY   CLINICAL DECISION MAKING:   Outcome Measure: Tool Used: Disabilities of the Arm, Shoulder and Hand (DASH) Questionnaire - Quick Version  Score:  Initial: 25/55  Most Recent: X/55 (Date: -- )   Interpretation of Score: The DASH is designed to measure the activities of daily living in person's with upper extremity dysfunction or pain. Each section is scored on a 1-5 scale, 5 representing the greatest disability. The scores of each section are added together for a total score of 55.     Score 11 12-19 20-28 29-37 38-45 46-54 55   Modifier CH CI CJ CK CL CM CN     ? Carrying, Moving, and Handling Objects:     - CURRENT STATUS: CJ - 20%-39% impaired, limited or restricted    - GOAL STATUS: CI - 1%-19% impaired, limited or restricted    - D/C STATUS:  ---------------To be determined---------------      Medical Necessity:   · Skilled intervention continues to be required due to LUE weakness as a result of a possible CVA. Reason for Services/Other Comments:  · Patient continues to require skilled intervention due to patient having difficulty completing functional tasks as a result of weakness in L dominant UE secondary to probable CVA. Clinical Decision-Making: MODERATE COMPLEXITY   TREATMENT:   (In addition to Assessment/Re-Assessment sessions the following treatments were rendered)  Pre-treatment Symptoms/Complaints:  Pt is performing HEP daily. She reports she her MRI last week. She is awaiting results. She has difficulty with small  Fasteners and LUE fatigues easily. Pain: Initial: Pain Intensity 1: 1  Post Session:  1:1     OT evaluation completed. Therapeutic exercise (45 minutes):  Patient completed  Ergometer for 10 minutes with light resistance, dowel exercises in standing, 20 reps each x 3 sets with 3 lb weight stick:  Shoulder flex/ext, internal/external rotation, horizontal abd/adduction, scapular pro/retraction, forward and back liang large circles. Theraputty  Extrinsic and intrinsic strengthening exercises. Graded clothespins, paper clip activity, velcro board and stringing beads for coordination. Strength measured and improved as follows:  L  56#, L tip  Pinch 11#, L lateral pinch 13# & L 3 jaaw amadou 17#. Reviewed importance of pacing herself when completing tasks to decrease level of fatigue with LUE. Therapeutic activity ( 15 minutes): Patient completed functional tasks to improve strength/coordination to include opening bottles and pop beads. She was unable to open a new water bottle using L hand.   A bottle that was already open she was able to manage. She reports handwriting is difficult - will purchase an adult coloring book and add coloring to her HEP. Treatment/Session Assessment:    · Response to Treatment:  Patients tolerated treatment well with no complications. Patient is improving in L strength -- see TE/TA section above. · Compliance with Program/Exercises: excellent follow through with HEP  · Recommendations/Intent for next treatment session: \"Next visit will focus on advancements to more challenging activities\".   Total Treatment Duration:  OT Patient Time In/Time Out  Time In: 0800  Time Out: 0900    Cele Jain OT

## 2017-10-04 ENCOUNTER — HOSPITAL ENCOUNTER (OUTPATIENT)
Dept: PHYSICAL THERAPY | Age: 67
Discharge: HOME | End: 2017-10-04
Attending: INTERNAL MEDICINE
Payer: MEDICARE

## 2017-10-04 ENCOUNTER — HOSPITAL ENCOUNTER (OUTPATIENT)
Dept: PHYSICAL THERAPY | Age: 67
Discharge: HOME | End: 2017-10-04
Payer: MEDICARE

## 2017-10-04 PROCEDURE — 97110 THERAPEUTIC EXERCISES: CPT

## 2017-10-04 PROCEDURE — 97530 THERAPEUTIC ACTIVITIES: CPT

## 2017-10-04 NOTE — PROGRESS NOTES
Wilmon Brittle  : 1950 [facility] at [place] [address] Therapy  [address], [address]  [phone]   [phone]       OUTPATIENT PHYSICAL THERAPY:Daily Note 10/4/2017    ICD-10: Treatment Diagnosis:   G81.94 Hemiplegia, unspecified affecting left nondominant side   Precautions/Allergies:   Review of patient's allergies indicates no known allergies. Fall Risk Score: 1 (? 5 = High Risk)  MD Orders: Evaluate and treat MEDICAL/REFERRING DIAGNOSIS:  Cerebral infarction, unspecified [I63.9]  Weakness [R53.1]   DATE OF ONSET: 2017  REFERRING PHYSICIAN: Rajesh Ruano MD  RETURN PHYSICIAN APPOINTMENT: TBD     INITIAL ASSESSMENT:  Ms. Domingo Weaver reported an onset of L UE and LE hemiparesis on 2017 while on a cruise in the  [facility]. She returned home an reported a recent MRI she is awaiting for results. She is ambulating with a cane and L foot drop and reports she fatigues easily with prolonged activity. She was referred to physical therapy for care. PROBLEM LIST (Impacting functional limitations):  1. Decreased Strength  2. Decreased Ambulation Ability/Technique  3. Decreased Activity Tolerance INTERVENTIONS PLANNED:  1. Neuromuscular Re-education/Strengthening  2. Range of Motion (ROM)  3. Therapeutic Exercise/Strengthening   TREATMENT PLAN:  Effective Dates: 2017 TO 2017. Frequency/Duration: 2 times a week for 8 weeks  GOALS: (Goals have been discussed and agreed upon with patient.)  Short-Term Functional Goals: Time Frame: 4 weeks. 1. Increase LEFS 4 points to decrease fall risk. 2. Increase dorsiflexion 5 degrees to decrease fall risk. 3. Increase L single leg balance 1-2 seconds to decrease fall risk. Discharge Goals: Time Frame: 8 weeks. 1. INcrease LEFS 9 points to decrease fall risk. 2. Increase dorsiflexion 10 degrees to decrease fall risk.    3. Demonstrate independence in home exercises to allow DC from physical therapy. Rehabilitation Potential For Stated Goals: Good              The information in this section was collected on 9/25/2017 (except where otherwise noted). HISTORY:   History of Present Injury/Illness (Reason for Referral): The patient reported an onset of L UE and LE hemiparesis on 8/29/2017 while on a cruise in the [facility]. She returned home an reported a recent MRI she is awaiting for results. She is ambulating with a cane and L foot drop and reports she fatigues easily with prolonged activity. She was referred to physical therapy for care. Past Medical History/Comorbidities:   Ms. Mary Shipman  has a past medical history of Hypertension (1/8/2013) and Hypothyroidism (1/8/2013). Ms. Mary Shipman  has a past surgical history that includes hysterectomy; cholecystectomy (2001); and tonsillectomy. Social History/Living Environment:    Independent,   Prior Level of Function/Work/Activity:  Retired  Dominant Side:         RIGHT    Current Medications:       Current Outpatient Prescriptions:     clopidogrel (PLAVIX) 75 mg tab, Take 1 Tab by mouth daily. This replaces any daily ASA., Disp: 90 Tab, Rfl: 3    benzonatate (TESSALON) 100 mg capsule, Take 1 Cap by mouth three (3) times daily as needed for Cough. , Disp: 30 Cap, Rfl: 2    albuterol (PROVENTIL HFA, VENTOLIN HFA, PROAIR HFA) 90 mcg/actuation inhaler, Take 2 Puffs by inhalation every four (4) hours as needed for Wheezing (cough). , Disp: 1 Inhaler, Rfl: 0    zolpidem (AMBIEN) 10 mg tablet, Take 1 Tab by mouth nightly as needed. , Disp: 90 Tab, Rfl: 1    SYNTHROID 100 mcg tablet, TAKE 1 TABLET DAILY BEFORE BREAKFAST, Disp: 90 Tab, Rfl: 3    cholecalciferol (VITAMIN D3) 1,000 unit tablet, Take  by mouth daily. , Disp: , Rfl:     raloxifene (EVISTA) 60 mg tablet, TAKE 1 TABLET DAILY, Disp: 90 Tab, Rfl: 3    cyclobenzaprine (FLEXERIL) 10 mg tablet, Take 1 Tab by mouth three (3) times daily as needed for Muscle Spasm(s). , Disp: 30 Tab, Rfl: 1   HYDROcodone-acetaminophen (NORCO) 5-325 mg per tablet, Take 1 Tab by mouth every six (6) hours as needed for Pain. Max Daily Amount: 4 Tabs., Disp: 20 Tab, Rfl: 0    valsartan-hydroCHLOROthiazide (DIOVAN-HCT) 160-25 mg per tablet, TAKE 1 TABLET DAILY, Disp: 90 Tab, Rfl: 3    GLUCOSAMINE HCL/CHONDR PÉREZ A NA (GLUCOSAMINE-CHONDROITIN) 750-600 mg tab, Take  by mouth., Disp: , Rfl:     alclometasone (ACLOVATE) 0.05 % topical cream, Apply  to affected area two (2) times a day. apply thin layer as directed, Disp: 60 g, Rfl: 0    aspirin 81 mg tablet, Take 81 mg by mouth. Indications: taking 3 tablets. , Disp: , Rfl:     calcium 500 mg Tab, Take  by mouth., Disp: , Rfl:     potassium 99 mg tablet, Take 99 mg by mouth daily as needed. , Disp: , Rfl:    Date Last Reviewed:  10/4/2017   Number of Personal Factors/Comorbidities that affect the Plan of Care: 1-2: MODERATE COMPLEXITY   EXAMINATION:   SINGLE LEG BALANCE:  R  +10     seconds,   L  2    seconds  Observation/Orthostatic Postural Assessment:   L LE limp with a L foot drop. Palpation:   No specific c/o of pain. ROM: PROM : ANKLE   90 degrees dorsiflexion bilaterally (9/25)                       Strength:    +3/5 L LE Strength   +4/5 R LE Strength           Special Tests: N/A  Neurological Screen: N/A  Functional Mobility: Independent with cane. Balance:  Fair. Body Structures Involved:  1. Muscles Body Functions Affected:  1. Sensory/Pain  2. Neuromusculoskeletal  3. Movement Related Activities and Participation Affected:  1. General Tasks and Demands  2. Mobility  3. Community, Social and [place]   Number of elements (examined above) that affect the Plan of Care: 3: MODERATE COMPLEXITY   CLINICAL PRESENTATION:   Presentation: Evolving clinical presentation with changing clinical characteristics: MODERATE COMPLEXITY   CLINICAL DECISION MAKING:   Outcome Measure:    Tool Used: Lower Extremity Functional Scale (LEFS)  Score:  Initial: 49/80 Most Recent: X/80 (Date: -- )   Interpretation of Score: 20 questions each scored on a 5 point scale with 0 representing \"extreme difficulty or unable to perform\" and 4 representing \"no difficulty\". The lower the score, the greater the functional disability. 80/80 represents no disability. Minimal detectable change is 9 points. Score 80 79-63 62-48 47-32 31-16 15-1 0   Modifier CH CI CJ CK CL CM CN     ? Mobility - Walking and Moving Around:     - CURRENT STATUS: CJ - 20%-39% impaired, limited or restricted    - GOAL STATUS: CI - 1%-19% impaired, limited or restricted    - D/C STATUS:  ---------------To be determined---------------      Medical Necessity:   · Patient demonstrates good rehab potential due to higher previous functional level. Reason for Services/Other Comments:  · Patient continues to require skilled intervention due to increase L side LE strength to allow safe weight bearing ADLs and decrease fall risk. .   Use of outcome tool(s) and clinical judgement create a POC that gives a: Questionable prediction of patient's progress: MODERATE COMPLEXITY            TREATMENT:   (In addition to Assessment/Re-Assessment sessions the following treatments were rendered)  Pre-treatment Symptoms/Complaints: Patient reports leg still feels tight, but therapy seems to be helping. Pain: Initial: Pain Intensity 1: 1  Post Session:  1   Therapeutic Exercise: ( 60): The patient received treatment as below to improve mobility, strength and balance. Required moderate verbal and manual cues to promote proper body alignment, promote proper body posture and promote proper body mechanics. Progressed resistance, range and repetitions as indicated. The patient received exercise instruction followed by patient demonstration of the exercises as below to decrease strength and AROM deficits, improve balance and ambulatory skills and decrease fall risk.   [employer] Portal  Evaluation: (  )   Date:  9/25/2017 Date:  9/28/2017 Date:  10-4-17   Activity/Exercise Parameters Parameters Parameters   MAT  EXERCISES:      Hip ER / Abduction  2 x 10    Hip Adduction (Ball Squeeze)      Bridging (Single Leg)  2 x 10    Straight Leg Raise      SAQ      Hip Abduction (Sidelying)      Gluteus Medius (Clam Shell)      Sit To Stand   2 x 10         STANDING HIP MACHINE:      Hip Abduction [email or web address] x 10(B) [email or web address] x 10(B) [email or web address] x 10(B)   Hip Flexion [email or web address] x 10(B) [email or web address] x 10(B) [email or web address] x 10(B)   Hip Extension [email or web address] x 10(B) [email or web address] x 10(B) [email or web address] x 10(B)   Hip Adduction      Standing Knee Extension      4 Way Cable Walkout      Gluteus Medius (Crab Walk)      Knee Extension [email or web address] x 10(B) [email or web address] x 10(B) [email or web address] x 10(B)   Knee Flexion [email or web address] x 10(B) [email or web address] x 10(B) [email or web address] x 10(B)   Step Downs  2 x 10(B) 2 x 10(B)         Stationary Bike      Treadmill      NuStep  10 minutes 10 minutes         FLEXIBILITY:      Heel Cord 3 min 3 min 3 min   Hamstring      Hip Flexors              Manual Therapy (     ):   Therapeutic Modalities:                                                                                               HEP: As directed. Treatment/Session Assessment:    · Response to Treatment:  The patient tolerated today's treatment well today. The patient demonstrated good exercise effort indicating that her strength seems to be getting better. Patient needs to continue to work on her balance to improve her ambulation on uneven surfaces. Good motivation and compliance with home exercises. · Compliance with Program/Exercises: compliant all of the time. · Recommendations/Intent for next treatment session: \"Next visit will focus on advancements to more challenging activities\".   Total Treatment Duration:   0900   [address] [place], [place]

## 2017-10-04 NOTE — PROGRESS NOTES
Héctor Meza  : 1950 [facility] at [facility]  [address], [facility], [address]  [phone]   [phone]       Héctor Meza  : 1950 Therapy Center at [place] [address] [facility], [address] [place] [address],[address], [facility] [address].  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 Fall Risk Score: 0 (? 5 = High Risk)  MD Orders: OT to address LUE weakness secondary to CVA MEDICAL/REFERRING DIAGNOSIS:   Lymphedema, not elsewhere classified [I89.0]   DATE OF ONSET: 2017   REFERRING PHYSICIAN: Yon Ortiz MD  RETURN PHYSICIAN APPOINTMENT: 2018     INITIAL ASSESSMENT:  Ms. Yoel Fontana presents with decreased functional use, strength and range of motion of her left upper extremity that is affecting her independence with activities of daily living and ability to perform job tasks. I feel that Ms. Yoel Fontana will benefit from skilled occupational therapy to maximize the functional use of her upper extremity in daily activities and work tasks. PLAN OF CARE:   PROBLEM LIST:  1. Decreased strength in LUE/hand  2. Decreased fine motor coordination in LUE/hand  3. Decreased functional use of LUE for functional tasks INTERVENTIONS PLANNED:  1. Modalities that may include fluidotherapy, paraffin, ultrasound, and light therapy. 2. Therapeutic exercise including a home exercise program.  3. Manual therapy. 4. Therapeutic activities. TREATMENT PLAN:  Effective Dates: 2017 TO 2017.   Frequency/Duration: 2 times a week for 8 weeks  GOALS: (Goals have been discussed and agreed upon with patient.)  Short-Term Functional Goals: Time Frame: 4 weeks  1. Increase LUE strength to 4/5 to allow improved functional use of LUE for tasks such as cutting fabric/gardening  2. Patient to be independent in [facility] for strengthening/coordination of LUE  3. Increase strength in L  by 10 pounds to allow patient to  and lift objects during self care activities. Discharge Goals: Time Frame: 8 weeks  1. Improve DASH rating by 6-8 points indicating overall improved functional use of LUE for self care/homeaking/hobbies  2. Increase motion in L shoulder girdle to [facility] to allow patient to perform all ADL activities. 3. Increase strength in L   by 15 pounds to allow patient to , lift, hold, and carry heavy objects. Rehabilitation Potential For Stated Goals: Excellent  Regarding Jack Caballero's therapy, I certify that the treatment plan above will be carried out by a therapist or under their direction. Thank you for this referral,  Margot Banks OT       Referring Physician Signature: Torres Barroso MD _________________________  Date _________            The information in this section was collected on 9/11/2017 (except where otherwise noted). OCCUPATIONAL PROFILE & HISTORY:   History of Present Injury/Illness (Reason for Referral):  Patient report she had a probable R CVA on 8/29/2017 while on a cruise ship in [place]. She was taken to the [place] [address] and stayed there for 4 days until she returned to the [facility] on 9/2/2017. Since then she reports decreased hand/shoulder strength making it difficult for her to open containers, cut fabric and garden all of which she routinely performs. She has an MRI and US scheduled for 9/21/2017 to confirm CVA. Patient is also set to begin PT to address LLE weakness. Since this incident she uses a straight cane for ambulation and did not use one prior. Past Medical History/Comorbidities:     Ms. Jennifer Villarreal  has a past medical history of Hypertension (1/8/2013) and Hypothyroidism (1/8/2013).   Ms. Jennifer Villarreal  has a past surgical history that includes hysterectomy; cholecystectomy (2001); and tonsillectomy. Social History/Living Environment:    Patient lives with her  in a 1-story home. She and  travel frequently. Prior Level of Function/Work/Activity:  Retired   Dominant Side:         LEFT  Personal Factors:          Sex:  female        Age:  77 y.o. Current Medications:    Current Outpatient Prescriptions:     clopidogrel (PLAVIX) 75 mg tab, Take 1 Tab by mouth daily. This replaces any daily ASA., Disp: 90 Tab, Rfl: 3    benzonatate (TESSALON) 100 mg capsule, Take 1 Cap by mouth three (3) times daily as needed for Cough. , Disp: 30 Cap, Rfl: 2    albuterol (PROVENTIL HFA, VENTOLIN HFA, PROAIR HFA) 90 mcg/actuation inhaler, Take 2 Puffs by inhalation every four (4) hours as needed for Wheezing (cough). , Disp: 1 Inhaler, Rfl: 0    zolpidem (AMBIEN) 10 mg tablet, Take 1 Tab by mouth nightly as needed. , Disp: 90 Tab, Rfl: 1    SYNTHROID 100 mcg tablet, TAKE 1 TABLET DAILY BEFORE BREAKFAST, Disp: 90 Tab, Rfl: 3    cholecalciferol (VITAMIN D3) 1,000 unit tablet, Take  by mouth daily. , Disp: , Rfl:     raloxifene (EVISTA) 60 mg tablet, TAKE 1 TABLET DAILY, Disp: 90 Tab, Rfl: 3    cyclobenzaprine (FLEXERIL) 10 mg tablet, Take 1 Tab by mouth three (3) times daily as needed for Muscle Spasm(s). , Disp: 30 Tab, Rfl: 1    HYDROcodone-acetaminophen (NORCO) 5-325 mg per tablet, Take 1 Tab by mouth every six (6) hours as needed for Pain. Max Daily Amount: 4 Tabs., Disp: 20 Tab, Rfl: 0    valsartan-hydroCHLOROthiazide (DIOVAN-HCT) 160-25 mg per tablet, TAKE 1 TABLET DAILY, Disp: 90 Tab, Rfl: 3    GLUCOSAMINE HCL/CHONDR PÉREZ A NA (GLUCOSAMINE-CHONDROITIN) 750-600 mg tab, Take  by mouth., Disp: , Rfl:     alclometasone (ACLOVATE) 0.05 % topical cream, Apply  to affected area two (2) times a day. apply thin layer as directed, Disp: 60 g, Rfl: 0    aspirin 81 mg tablet, Take 81 mg by mouth.  Indications: taking 3 tablets. , Disp: , Rfl:     calcium 500 mg Tab, Take  by mouth., Disp: , Rfl:     potassium 99 mg tablet, Take 99 mg by mouth daily as needed. , Disp: , Rfl:    Date Last Reviewed:  10/4/2017   Number of medical conditions (excluding presenting problem) that affect the Plan of Care: Expanded review of therapy/medical records (1-2):  MODERATE COMPLEXITY   ASSESSMENT OF OCCUPATIONAL PERFORMANCE:   RANGE OF MOTION:     · AROM: RUE WFL  ·             LUE WFL except shoulder flexion/ABD to 90 degrees           · PROM: WFL BUE's          STRENGTH:  RUE 4+/5                      LUE shoulder girdle 3-/5, elbow/forearm 4-/5                          R 64#   L 29#                      Tip   R 8#    L 9#                      Lat. Pinch  R 11#    L  4#                      3 jaw amadou   R 12#   L10#      SENSATION:  Intact BUE's    COORDINATION:  9 hole peg test   R 23 seconds    L 22 seconds                              Patient has difficulty opening tight jars, cutting fabric, garden and cut food using L hand. She is otherwise independent in self care skills. Physical Skills Involved:  1. Range of Motion  2. Strength  3. Fine Motor Control Cognitive Skills Affected (resulting in the inability to perform in a timely and safe manner):  1. Psychosocial Skills Affected:  1. Habits/Routines   Number of elements that affect the Plan of Care: 3-5:  MODERATE COMPLEXITY   CLINICAL DECISION MAKING:   Outcome Measure: Tool Used: Disabilities of the Arm, Shoulder and Hand (DASH) Questionnaire - Quick Version  Score:  Initial: 25/55  Most Recent: X/55 (Date: -- )   Interpretation of Score: The DASH is designed to measure the activities of daily living in person's with upper extremity dysfunction or pain. Each section is scored on a 1-5 scale, 5 representing the greatest disability. The scores of each section are added together for a total score of 55.     Score 11 12-19 20-28 29-37 38-45 46-54 55   Modifier CH CI CJ CK CL CM CN     ? Carrying, Moving, and Handling Objects:     - CURRENT STATUS: CJ - 20%-39% impaired, limited or restricted    - GOAL STATUS: CI - 1%-19% impaired, limited or restricted    - D/C STATUS:  ---------------To be determined---------------      Medical Necessity:   · Skilled intervention continues to be required due to LUE weakness as a result of a possible CVA. Reason for Services/Other Comments:  · Patient continues to require skilled intervention due to patient having difficulty completing functional tasks as a result of weakness in L dominant UE secondary to probable CVA. Clinical Decision-Making: MODERATE COMPLEXITY   TREATMENT:   (In addition to Assessment/Re-Assessment sessions the following treatments were rendered)  Pre-treatment Symptoms/Complaints:  Pt is performing HEP daily. She reports she her MRI last week. She is awaiting results. She is improving with management of small fasteners and LUE fatigues improving. Pain: Initial: Pain Intensity 1: 1  Post Session:  1:1     OT evaluation completed. Therapeutic exercise (45 minutes):  Patient completed  Ergometer for 10 minutes with light resistance, dowel exercises in standing, 20 reps each x 3 sets with 3 lb weight stick:  Shoulder flex/ext, internal/external rotation, horizontal abd/adduction, scapular pro/retraction, forward and back liang large circles. Theraputty  Extrinsic and intrinsic strengthening exercises. Graded clothespins, paper clip activity, velcro board and stringing beads for coordination. Strength measured and improved as follows:  L  56#, L tip  Pinch 11#, L lateral pinch 13# & L 3 jaaw amadou 17#. LUE ROM now WNL. Therapeutic activity ( 15 minutes): Patient completed functional tasks to improve strength/coordination to include opening bottles and pop beads.   Card activity to improve functional coordination - she can shuffle a deck of cards using B hands with greater ease.    Treatment/Session Assessment:    · Response to Treatment:  Patients tolerated treatment well with no complications. Patient is improving in L strength -- see TE/TA section above. · Compliance with Program/Exercises: excellent follow through with HEP  · Recommendations/Intent for next treatment session: \"Next visit will focus on advancements to more challenging activities\".   Total Treatment Duration:  OT Patient Time In/Time Out  Time In: 1000  Time Out: One Cameron Redd OT

## 2017-10-06 ENCOUNTER — HOSPITAL ENCOUNTER (OUTPATIENT)
Dept: PHYSICAL THERAPY | Age: 67
Discharge: HOME | End: 2017-10-06
Attending: INTERNAL MEDICINE
Payer: MEDICARE

## 2017-10-06 ENCOUNTER — HOSPITAL ENCOUNTER (OUTPATIENT)
Dept: PHYSICAL THERAPY | Age: 67
Discharge: HOME | End: 2017-10-06
Payer: MEDICARE

## 2017-10-06 PROCEDURE — 97110 THERAPEUTIC EXERCISES: CPT

## 2017-10-06 PROCEDURE — 97530 THERAPEUTIC ACTIVITIES: CPT

## 2017-10-06 NOTE — PROGRESS NOTES
Luis Eduardo Chance  : 1950 Therapy [facility] at [facility]  [address], [facility], [address]  [phone]   [phone]       OUTPATIENT PHYSICAL THERAPY:Daily Note 10/6/2017    ICD-10: Treatment Diagnosis:   G81.94 Hemiplegia, unspecified affecting left nondominant side   Precautions/Allergies:   Review of patient's allergies indicates no known allergies. Fall Risk Score: 1 (? 5 = High Risk)  MD Orders: Evaluate and treat MEDICAL/REFERRING DIAGNOSIS:  Cerebral infarction, unspecified [I63.9]  Weakness [R53.1]   DATE OF ONSET: 2017  REFERRING PHYSICIAN: Jade Bean MD  RETURN PHYSICIAN APPOINTMENT: WILVER     INITIAL ASSESSMENT:  Ms. Viky Weems reported an onset of L UE and LE hemiparesis on 2017 while on a cruise in the  [facility]. She returned home an reported a recent MRI she is awaiting for results. She is ambulating with a cane and L foot drop and reports she fatigues easily with prolonged activity. She was referred to physical therapy for care. PROBLEM LIST (Impacting functional limitations):  1. Decreased Strength  2. Decreased Ambulation Ability/Technique  3. Decreased Activity Tolerance INTERVENTIONS PLANNED:  1. Neuromuscular Re-education/Strengthening  2. Range of Motion (ROM)  3. Therapeutic Exercise/Strengthening   TREATMENT PLAN:  Effective Dates: 2017 TO 2017. Frequency/Duration: 2 times a week for 8 weeks  GOALS: (Goals have been discussed and agreed upon with patient.)  Short-Term Functional Goals: Time Frame: 4 weeks. 1. Increase LEFS 4 points to decrease fall risk. 2. Increase dorsiflexion 5 degrees to decrease fall risk. 3. Increase L single leg balance 1-2 seconds to decrease fall risk. Discharge Goals: Time Frame: 8 weeks. 1. INcrease LEFS 9 points to decrease fall risk. 2. Increase dorsiflexion 10 degrees to decrease fall risk.    3. Demonstrate independence in home exercises to allow DC from physical therapy. Rehabilitation Potential For Stated Goals: Good              The information in this section was collected on 9/25/2017 (except where otherwise noted). HISTORY:   History of Present Injury/Illness (Reason for Referral): The patient reported an onset of L UE and LE hemiparesis on 8/29/2017 while on a cruise in the [facility]. She returned home an reported a recent MRI she is awaiting for results. She is ambulating with a cane and L foot drop and reports she fatigues easily with prolonged activity. She was referred to physical therapy for care. Past Medical History/Comorbidities:   Ms. Toby Morales  has a past medical history of Hypertension (1/8/2013) and Hypothyroidism (1/8/2013). Ms. Toby Morales  has a past surgical history that includes hysterectomy; cholecystectomy (2001); and tonsillectomy. Social History/Living Environment:    Independent,   Prior Level of Function/Work/Activity:  Retired  Dominant Side:         RIGHT    Current Medications:       Current Outpatient Prescriptions:     clopidogrel (PLAVIX) 75 mg tab, Take 1 Tab by mouth daily. This replaces any daily ASA., Disp: 90 Tab, Rfl: 3    benzonatate (TESSALON) 100 mg capsule, Take 1 Cap by mouth three (3) times daily as needed for Cough. , Disp: 30 Cap, Rfl: 2    albuterol (PROVENTIL HFA, VENTOLIN HFA, PROAIR HFA) 90 mcg/actuation inhaler, Take 2 Puffs by inhalation every four (4) hours as needed for Wheezing (cough). , Disp: 1 Inhaler, Rfl: 0    zolpidem (AMBIEN) 10 mg tablet, Take 1 Tab by mouth nightly as needed. , Disp: 90 Tab, Rfl: 1    SYNTHROID 100 mcg tablet, TAKE 1 TABLET DAILY BEFORE BREAKFAST, Disp: 90 Tab, Rfl: 3    cholecalciferol (VITAMIN D3) 1,000 unit tablet, Take  by mouth daily. , Disp: , Rfl:     raloxifene (EVISTA) 60 mg tablet, TAKE 1 TABLET DAILY, Disp: 90 Tab, Rfl: 3    cyclobenzaprine (FLEXERIL) 10 mg tablet, Take 1 Tab by mouth three (3) times daily as needed for Muscle Spasm(s). , Disp: 30 Tab, Rfl: 1   HYDROcodone-acetaminophen (NORCO) 5-325 mg per tablet, Take 1 Tab by mouth every six (6) hours as needed for Pain. Max Daily Amount: 4 Tabs., Disp: 20 Tab, Rfl: 0    valsartan-hydroCHLOROthiazide (DIOVAN-HCT) 160-25 mg per tablet, TAKE 1 TABLET DAILY, Disp: 90 Tab, Rfl: 3    GLUCOSAMINE HCL/CHONDR PÉREZ A NA (GLUCOSAMINE-CHONDROITIN) 750-600 mg tab, Take  by mouth., Disp: , Rfl:     alclometasone (ACLOVATE) 0.05 % topical cream, Apply  to affected area two (2) times a day. apply thin layer as directed, Disp: 60 g, Rfl: 0    aspirin 81 mg tablet, Take 81 mg by mouth. Indications: taking 3 tablets. , Disp: , Rfl:     calcium 500 mg Tab, Take  by mouth., Disp: , Rfl:     potassium 99 mg tablet, Take 99 mg by mouth daily as needed. , Disp: , Rfl:    Date Last Reviewed:  10/6/2017   Number of Personal Factors/Comorbidities that affect the Plan of Care: 1-2: MODERATE COMPLEXITY   EXAMINATION:   SINGLE LEG BALANCE:  R  +10     seconds,   L  2    seconds  Observation/Orthostatic Postural Assessment:   L LE limp with a L foot drop. Palpation:   No specific c/o of pain. ROM: PROM : ANKLE   90 degrees dorsiflexion bilaterally (9/25)                       Strength:    +3/5 L LE Strength   +4/5 R LE Strength           Special Tests: N/A  Neurological Screen: N/A  Functional Mobility: Independent with cane. Balance:  Fair. Body Structures Involved:  1. Muscles Body Functions Affected:  1. Sensory/Pain  2. Neuromusculoskeletal  3. Movement Related Activities and Participation Affected:  1. General Tasks and Demands  2. Mobility  3. Community, Social and [place]   Number of elements (examined above) that affect the Plan of Care: 3: MODERATE COMPLEXITY   CLINICAL PRESENTATION:   Presentation: Evolving clinical presentation with changing clinical characteristics: MODERATE COMPLEXITY   CLINICAL DECISION MAKING:   Outcome Measure:    Tool Used: Lower Extremity Functional Scale (LEFS)  Score:  Initial: 49/80 Most Recent: X/80 (Date: -- )   Interpretation of Score: 20 questions each scored on a 5 point scale with 0 representing \"extreme difficulty or unable to perform\" and 4 representing \"no difficulty\". The lower the score, the greater the functional disability. 80/80 represents no disability. Minimal detectable change is 9 points. Score 80 79-63 62-48 47-32 31-16 15-1 0   Modifier CH CI CJ CK CL CM CN     ? Mobility - Walking and Moving Around:     - CURRENT STATUS: CJ - 20%-39% impaired, limited or restricted    - GOAL STATUS: CI - 1%-19% impaired, limited or restricted    - D/C STATUS:  ---------------To be determined---------------      Medical Necessity:   · Patient demonstrates good rehab potential due to higher previous functional level. Reason for Services/Other Comments:  · Patient continues to require skilled intervention due to increase L side LE strength to allow safe weight bearing ADLs and decrease fall risk. .   Use of outcome tool(s) and clinical judgement create a POC that gives a: Questionable prediction of patient's progress: MODERATE COMPLEXITY            TREATMENT:   (In addition to Assessment/Re-Assessment sessions the following treatments were rendered)  Pre-treatment Symptoms/Complaints: Patient reports some muscle soreness following her last visit, but OK today. .  Pain: Initial: Pain Intensity 1: 1  Post Session:  1   Therapeutic Exercise: ( 60): The patient received treatment as below to improve mobility, strength and balance. Required moderate verbal and manual cues to promote proper body alignment, promote proper body posture and promote proper body mechanics. Progressed resistance, range and repetitions as indicated. The patient received exercise instruction followed by patient demonstration of the exercises as below to decrease strength and AROM deficits, improve balance and ambulatory skills and decrease fall risk.   [email or web address] Portal  Evaluation: (  )   Date:  9/25/2017 Date:  9/28/2017 Date:  10-4-17 Date:  10-6-2017      Activity/Exercise Parameters Parameters Parameters       MAT  EXERCISES:          Hip ER / Abduction  2 x 10        Hip Adduction (Ball Squeeze)          Bridging (Single Leg)  2 x 10        Straight Leg Raise          Standing Toe Raises    3 x 10      Hip Abduction (Sidelying)          Gluteus Medius (Clam Shell)          Sit To Stand   2 x 10                 STANDING HIP MACHINE:          Hip Abduction [email or web address] x 10(B) [email or web address] x 10(B) [email or web address] x 10(B)       Hip Flexion [email or web address] x 10(B) [email or web address] x 10(B) [email or web address] x 10(B)       Hip Extension [email or web address] x 10(B) [email or web address] x 10(B) [email or web address] x 10(B)       Hip Adduction          Standing Knee Extension          4 Way Cable Walkout    [email or web address] x 10      Gluteus Medius (Crab Walk)          Knee Extension [email or web address] x 10(B) [email or web address] x 10(B) [email or web address] x 10(B) [email or web address] x 10(B)      Knee Flexion [email or web address] x 10(B) [email or web address] x 10(B) [email or web address] x 10(B) [email or web address] x 10(B)      Step Downs  2 x 10(B) 2 x 10(B)                 Stationary Bike          Treadmill    5 min      NuStep  10 minutes 10 minutes 10 minutes      Single Leg Balance(Hand Touch)    5 min      FLEXIBILITY:          Heel Cord 3 min 3 min 3 min 3 min      Hamstring          Hip Flexors                      Manual Therapy (     ):   Therapeutic Modalities:                                                                                               HEP: As directed. Treatment/Session Assessment:    · Response to Treatment:  The patient tolerated today's treatment well today. The patient demonstrated good exercise effort, but noted the single leg balance with hand held touch, 4 way cable walkout, and bilateral toe raises with eccentric L LE lowering her heel to the ground was challenging. Patient needs to continue to work on her balance to improve her ambulation on uneven surfaces. Good motivation and compliance with home exercises. · Compliance with Program/Exercises: compliant all of the time. · Recommendations/Intent for next treatment session:  \"Next visit will focus on advancements to more challenging activities\".   Total Treatment Duration:   0800   0900    Jia Clark., PT

## 2017-10-06 NOTE — PROGRESS NOTES
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 Fall Risk Score: 0 (? 5 = High Risk)  MD Orders: OT to address LUE weakness secondary to CVA MEDICAL/REFERRING DIAGNOSIS:   Lymphedema, not elsewhere classified [I89.0]   DATE OF ONSET: 2017   REFERRING PHYSICIAN: Karla Ortiz MD  RETURN PHYSICIAN APPOINTMENT: 2018     INITIAL ASSESSMENT:  Ms. Rosina Joiner presents with decreased functional use, strength and range of motion of her left upper extremity that is affecting her independence with activities of daily living and ability to perform job tasks. I feel that Ms. Rosina Joiner will benefit from skilled occupational therapy to maximize the functional use of her upper extremity in daily activities and work tasks. PLAN OF CARE:   PROBLEM LIST:  1. Decreased strength in LUE/hand  2. Decreased fine motor coordination in LUE/hand  3. Decreased functional use of LUE for functional tasks INTERVENTIONS PLANNED:  1. Modalities that may include fluidotherapy, paraffin, ultrasound, and light therapy. 2. Therapeutic exercise including a home exercise program.  3. Manual therapy. 4. Therapeutic activities. TREATMENT PLAN:  Effective Dates: 2017 TO 2017.   Frequency/Duration: 2 times a week for 8 weeks  GOALS: (Goals have been discussed and agreed upon with patient.)  Short-Term Functional Goals: Time Frame: 4 weeks  1. Increase LUE strength to 4/5 to allow improved functional use of LUE for tasks such as cutting fabric/gardening  2. Patient to be independent in [facility] for strengthening/coordination of LUE  3. Increase strength in L  by 10 pounds to allow patient to  and lift objects during self care activities. Discharge Goals: Time Frame: 8 weeks  1. Improve DASH rating by 6-8 points indicating overall improved functional use of LUE for self care/homeaking/hobbies  2. Increase motion in L shoulder girdle to [facility] to allow patient to perform all ADL activities. 3. Increase strength in L   by 15 pounds to allow patient to , lift, hold, and carry heavy objects. Rehabilitation Potential For Stated Goals: Excellent  Regarding Rogeliomindi Emilia Caballero's therapy, I certify that the treatment plan above will be carried out by a therapist or under their direction. Thank you for this referral,  Shraddha Roe OT       Referring Physician Signature: Marin Angelo MD _________________________  Date _________            The information in this section was collected on 9/11/2017 (except where otherwise noted). OCCUPATIONAL PROFILE & HISTORY:   History of Present Injury/Illness (Reason for Referral):  Patient report she had a probable R CVA on 8/29/2017 while on a cruise ship in [place]. She was taken to the [place] [address] and stayed there for 4 days until she returned to the [facility] on 9/2/2017. Since then she reports decreased hand/shoulder strength making it difficult for her to open containers, cut fabric and garden all of which she routinely performs. She has an MRI and US scheduled for 9/21/2017 to confirm CVA. Patient is also set to begin PT to address LLE weakness. Since this incident she uses a straight cane for ambulation and did not use one prior. Past Medical History/Comorbidities:     Ms. Joaquina Oseguera  has a past medical history of Hypertension (1/8/2013) and Hypothyroidism (1/8/2013).   Ms. Joaquina Oseguera  has a past surgical history that includes hysterectomy; cholecystectomy (2001); and tonsillectomy. Social History/Living Environment:    Patient lives with her  in a 1-story home. She and  travel frequently. Prior Level of Function/Work/Activity:  Retired   Dominant Side:         LEFT  Personal Factors:          Sex:  female        Age:  77 y.o. Current Medications:    Current Outpatient Prescriptions:     clopidogrel (PLAVIX) 75 mg tab, Take 1 Tab by mouth daily. This replaces any daily ASA., Disp: 90 Tab, Rfl: 3    benzonatate (TESSALON) 100 mg capsule, Take 1 Cap by mouth three (3) times daily as needed for Cough. , Disp: 30 Cap, Rfl: 2    albuterol (PROVENTIL HFA, VENTOLIN HFA, PROAIR HFA) 90 mcg/actuation inhaler, Take 2 Puffs by inhalation every four (4) hours as needed for Wheezing (cough). , Disp: 1 Inhaler, Rfl: 0    zolpidem (AMBIEN) 10 mg tablet, Take 1 Tab by mouth nightly as needed. , Disp: 90 Tab, Rfl: 1    SYNTHROID 100 mcg tablet, TAKE 1 TABLET DAILY BEFORE BREAKFAST, Disp: 90 Tab, Rfl: 3    cholecalciferol (VITAMIN D3) 1,000 unit tablet, Take  by mouth daily. , Disp: , Rfl:     raloxifene (EVISTA) 60 mg tablet, TAKE 1 TABLET DAILY, Disp: 90 Tab, Rfl: 3    cyclobenzaprine (FLEXERIL) 10 mg tablet, Take 1 Tab by mouth three (3) times daily as needed for Muscle Spasm(s). , Disp: 30 Tab, Rfl: 1    HYDROcodone-acetaminophen (NORCO) 5-325 mg per tablet, Take 1 Tab by mouth every six (6) hours as needed for Pain. Max Daily Amount: 4 Tabs., Disp: 20 Tab, Rfl: 0    valsartan-hydroCHLOROthiazide (DIOVAN-HCT) 160-25 mg per tablet, TAKE 1 TABLET DAILY, Disp: 90 Tab, Rfl: 3    GLUCOSAMINE HCL/CHONDR PÉREZ A NA (GLUCOSAMINE-CHONDROITIN) 750-600 mg tab, Take  by mouth., Disp: , Rfl:     alclometasone (ACLOVATE) 0.05 % topical cream, Apply  to affected area two (2) times a day. apply thin layer as directed, Disp: 60 g, Rfl: 0    aspirin 81 mg tablet, Take 81 mg by mouth.  Indications: taking 3 tablets. , Disp: , Rfl:     calcium 500 mg Tab, Take  by mouth., Disp: , Rfl:     potassium 99 mg tablet, Take 99 mg by mouth daily as needed. , Disp: , Rfl:    Date Last Reviewed:  10/6/2017   Number of medical conditions (excluding presenting problem) that affect the Plan of Care: Expanded review of therapy/medical records (1-2):  MODERATE COMPLEXITY   ASSESSMENT OF OCCUPATIONAL PERFORMANCE:   RANGE OF MOTION:     · AROM: RUE WFL  ·             LUE WFL except shoulder flexion/ABD to 90 degrees           · PROM: WFL BUE's          STRENGTH:  RUE 4+/5                      LUE shoulder girdle 3-/5, elbow/forearm 4-/5                          R 64#   L 29#                      Tip   R 8#    L 9#                      Lat. Pinch  R 11#    L  4#                      3 jaw amadou   R 12#   L10#      SENSATION:  Intact BUE's    COORDINATION:  9 hole peg test   R 23 seconds    L 22 seconds                              Patient has difficulty opening tight jars, cutting fabric, garden and cut food using L hand. She is otherwise independent in self care skills. Physical Skills Involved:  1. Range of Motion  2. Strength  3. Fine Motor Control Cognitive Skills Affected (resulting in the inability to perform in a timely and safe manner):  1. Psychosocial Skills Affected:  1. Habits/Routines   Number of elements that affect the Plan of Care: 3-5:  MODERATE COMPLEXITY   CLINICAL DECISION MAKING:   Outcome Measure: Tool Used: Disabilities of the Arm, Shoulder and Hand (DASH) Questionnaire - Quick Version  Score:  Initial: 25/55  Most Recent: X/55 (Date: -- )   Interpretation of Score: The DASH is designed to measure the activities of daily living in person's with upper extremity dysfunction or pain. Each section is scored on a 1-5 scale, 5 representing the greatest disability. The scores of each section are added together for a total score of 55.     Score 11 12-19 20-28 29-37 38-45 46-54 55   Modifier CH CI CJ CK CL CM CN     ? Carrying, Moving, and Handling Objects:     - CURRENT STATUS: CJ - 20%-39% impaired, limited or restricted    - GOAL STATUS: CI - 1%-19% impaired, limited or restricted    - D/C STATUS:  ---------------To be determined---------------      Medical Necessity:   · Skilled intervention continues to be required due to LUE weakness as a result of a possible CVA. Reason for Services/Other Comments:  · Patient continues to require skilled intervention due to patient having difficulty completing functional tasks as a result of weakness in L dominant UE secondary to probable CVA. Clinical Decision-Making: MODERATE COMPLEXITY   TREATMENT:   (In addition to Assessment/Re-Assessment sessions the following treatments were rendered)  Pre-treatment Symptoms/Complaints:  Pt is performing HEP daily. She reports she her MRI last week. She is awaiting results. She is improving with management of small fasteners and LUE fatigues improving. Pain: Initial: Pain Intensity 1: 1  Post Session:  1:1     OT evaluation completed. Therapeutic exercise (45 minutes):  Patient completed  Ergometer for 10 minutes with light/medium resistance, dowel exercises in standing, 20 reps each x 3 sets with 3 lb weight stick:  Shoulder flex/ext, internal/external rotation, horizontal abd/adduction, scapular pro/retraction, forward and back liang large circles, green therabar 20 reps x2. Theraputty  Extrinsic and intrinsic strengthening exercises. Graded clothespins, paper clip activity, velcro board and . Strength measured and improved as follows:  L  56#, L tip  Pinch 11#, L lateral pinch 13# & L 3 jaaw amadou 17#. LUE ROM now WNL. Therapeutic activity ( 15 minutes): Patient completed functional tasks to improve strength/coordination to include manipulating small beads , 1/8\" pegs to aid in managing small objects.     Treatment/Session Assessment:    · Response to Treatment:  Patients tolerated treatment well with no complications. Patient is improving in L strength -- see TE/TA section above. · Compliance with Program/Exercises: excellent follow through with HEP  · Recommendations/Intent for next treatment session: \"Next visit will focus on advancements to more challenging activities\".   Total Treatment Duration:  OT Patient Time In/Time Out  Time In: 0900  Time Out: [id number] Janice Mcknight, OT

## 2017-10-09 ENCOUNTER — HOSPITAL ENCOUNTER (OUTPATIENT)
Dept: PHYSICAL THERAPY | Age: 67
Discharge: HOME | End: 2017-10-09
Payer: MEDICARE

## 2017-10-09 ENCOUNTER — HOSPITAL ENCOUNTER (OUTPATIENT)
Dept: PHYSICAL THERAPY | Age: 67
Discharge: HOME | End: 2017-10-09
Attending: INTERNAL MEDICINE
Payer: MEDICARE

## 2017-10-09 PROCEDURE — G8985 CARRY GOAL STATUS: HCPCS

## 2017-10-09 PROCEDURE — 97110 THERAPEUTIC EXERCISES: CPT

## 2017-10-09 PROCEDURE — G8986 CARRY D/C STATUS: HCPCS

## 2017-10-09 NOTE — PROGRESS NOTES
Abby Grijalvario  : 1950 Therapy Center at [facility] Therapy  [address], [facility], [address]  [phone]   [phone]       OUTPATIENT PHYSICAL THERAPY:Daily Note 10/9/2017    ICD-10: Treatment Diagnosis:   G81.94 Hemiplegia, unspecified affecting left nondominant side   Precautions/Allergies:   Review of patient's allergies indicates no known allergies. Fall Risk Score: 1 (? 5 = High Risk)  MD Orders: Evaluate and treat MEDICAL/REFERRING DIAGNOSIS:  Cerebral infarction, unspecified [I63.9]  Weakness [R53.1]   DATE OF ONSET: 2017  REFERRING PHYSICIAN: Lennox Mimes, MD  RETURN PHYSICIAN APPOINTMENT: TBD     INITIAL ASSESSMENT:  Ms. Nicholas Pereyra reported an onset of L UE and LE hemiparesis on 2017 while on a cruise in the  [facility]. She returned home an reported a recent MRI she is awaiting for results. She is ambulating with a cane and L foot drop and reports she fatigues easily with prolonged activity. She was referred to physical therapy for care. PROBLEM LIST (Impacting functional limitations):  1. Decreased Strength  2. Decreased Ambulation Ability/Technique  3. Decreased Activity Tolerance INTERVENTIONS PLANNED:  1. Neuromuscular Re-education/Strengthening  2. Range of Motion (ROM)  3. Therapeutic Exercise/Strengthening   TREATMENT PLAN:  Effective Dates: 2017 TO 2017. Frequency/Duration: 2 times a week for 8 weeks  GOALS: (Goals have been discussed and agreed upon with patient.)  Short-Term Functional Goals: Time Frame: 4 weeks. 1. Increase LEFS 4 points to decrease fall risk. 2. Increase dorsiflexion 5 degrees to decrease fall risk. 3. Increase L single leg balance 1-2 seconds to decrease fall risk. Discharge Goals: Time Frame: 8 weeks. 1. INcrease LEFS 9 points to decrease fall risk. 2. Increase dorsiflexion 10 degrees to decrease fall risk.    3. Demonstrate independence in home exercises to allow DC from physical therapy. Rehabilitation Potential For Stated Goals: Good              The information in this section was collected on 9/25/2017 (except where otherwise noted). HISTORY:   History of Present Injury/Illness (Reason for Referral): The patient reported an onset of L UE and LE hemiparesis on 8/29/2017 while on a cruise in the [facility]. She returned home an reported a recent MRI she is awaiting for results. She is ambulating with a cane and L foot drop and reports she fatigues easily with prolonged activity. She was referred to physical therapy for care. Past Medical History/Comorbidities:   Ms. Shant Burrows  has a past medical history of Hypertension (1/8/2013) and Hypothyroidism (1/8/2013). Ms. Shant Burrows  has a past surgical history that includes hysterectomy; cholecystectomy (2001); and tonsillectomy. Social History/Living Environment:    Independent,   Prior Level of Function/Work/Activity:  Retired  Dominant Side:         RIGHT    Current Medications:       Current Outpatient Prescriptions:     clopidogrel (PLAVIX) 75 mg tab, Take 1 Tab by mouth daily. This replaces any daily ASA., Disp: 90 Tab, Rfl: 3    benzonatate (TESSALON) 100 mg capsule, Take 1 Cap by mouth three (3) times daily as needed for Cough. , Disp: 30 Cap, Rfl: 2    albuterol (PROVENTIL HFA, VENTOLIN HFA, PROAIR HFA) 90 mcg/actuation inhaler, Take 2 Puffs by inhalation every four (4) hours as needed for Wheezing (cough). , Disp: 1 Inhaler, Rfl: 0    zolpidem (AMBIEN) 10 mg tablet, Take 1 Tab by mouth nightly as needed. , Disp: 90 Tab, Rfl: 1    SYNTHROID 100 mcg tablet, TAKE 1 TABLET DAILY BEFORE BREAKFAST, Disp: 90 Tab, Rfl: 3    cholecalciferol (VITAMIN D3) 1,000 unit tablet, Take  by mouth daily. , Disp: , Rfl:     raloxifene (EVISTA) 60 mg tablet, TAKE 1 TABLET DAILY, Disp: 90 Tab, Rfl: 3    cyclobenzaprine (FLEXERIL) 10 mg tablet, Take 1 Tab by mouth three (3) times daily as needed for Muscle Spasm(s). , Disp: 30 Tab, Rfl: 1   HYDROcodone-acetaminophen (NORCO) 5-325 mg per tablet, Take 1 Tab by mouth every six (6) hours as needed for Pain. Max Daily Amount: 4 Tabs., Disp: 20 Tab, Rfl: 0    valsartan-hydroCHLOROthiazide (DIOVAN-HCT) 160-25 mg per tablet, TAKE 1 TABLET DAILY, Disp: 90 Tab, Rfl: 3    GLUCOSAMINE HCL/CHONDR PÉREZ A NA (GLUCOSAMINE-CHONDROITIN) 750-600 mg tab, Take  by mouth., Disp: , Rfl:     alclometasone (ACLOVATE) 0.05 % topical cream, Apply  to affected area two (2) times a day. apply thin layer as directed, Disp: 60 g, Rfl: 0    aspirin 81 mg tablet, Take 81 mg by mouth. Indications: taking 3 tablets. , Disp: , Rfl:     calcium 500 mg Tab, Take  by mouth., Disp: , Rfl:     potassium 99 mg tablet, Take 99 mg by mouth daily as needed. , Disp: , Rfl:    Date Last Reviewed:  10/9/2017   Number of Personal Factors/Comorbidities that affect the Plan of Care: 1-2: MODERATE COMPLEXITY   EXAMINATION:   SINGLE LEG BALANCE:  R  +10     seconds,   L  2    seconds  Observation/Orthostatic Postural Assessment:   L LE limp with a L foot drop. Palpation:   No specific c/o of pain. ROM: PROM : ANKLE   90 degrees dorsiflexion bilaterally (9/25)                       Strength:    +3/5 L LE Strength   +4/5 R LE Strength           Special Tests: N/A  Neurological Screen: N/A  Functional Mobility: Independent with cane. Balance:  Fair. Body Structures Involved:  1. Muscles Body Functions Affected:  1. Sensory/Pain  2. Neuromusculoskeletal  3. Movement Related Activities and Participation Affected:  1. General Tasks and Demands  2. Mobility  3. Community, Social and [place]   Number of elements (examined above) that affect the Plan of Care: 3: MODERATE COMPLEXITY   CLINICAL PRESENTATION:   Presentation: Evolving clinical presentation with changing clinical characteristics: MODERATE COMPLEXITY   CLINICAL DECISION MAKING:   Outcome Measure:    Tool Used: Lower Extremity Functional Scale (LEFS)  Score:  Initial: 49/80 Most Recent: X/80 (Date: -- )   Interpretation of Score: 20 questions each scored on a 5 point scale with 0 representing \"extreme difficulty or unable to perform\" and 4 representing \"no difficulty\". The lower the score, the greater the functional disability. 80/80 represents no disability. Minimal detectable change is 9 points. Score 80 79-63 62-48 47-32 31-16 15-1 0   Modifier CH CI CJ CK CL CM CN     ? Mobility - Walking and Moving Around:     - CURRENT STATUS: CJ - 20%-39% impaired, limited or restricted    - GOAL STATUS: CI - 1%-19% impaired, limited or restricted    - D/C STATUS:  ---------------To be determined---------------      Medical Necessity:   · Patient demonstrates good rehab potential due to higher previous functional level. Reason for Services/Other Comments:  · Patient continues to require skilled intervention due to increase L side LE strength to allow safe weight bearing ADLs and decrease fall risk. .   Use of outcome tool(s) and clinical judgement create a POC that gives a: Questionable prediction of patient's progress: MODERATE COMPLEXITY            TREATMENT:   (In addition to Assessment/Re-Assessment sessions the following treatments were rendered)  Pre-treatment Symptoms/Complaints: Patient reported increased scapular pain unrelated to therapy. Pain: Initial: Pain Intensity 1: 0  Post Session:  1   Therapeutic Exercise: ( 45): The patient received treatment as below to improve mobility, strength and balance. Required moderate verbal and manual cues to promote proper body alignment, promote proper body posture and promote proper body mechanics. Progressed resistance, range and repetitions as indicated. The patient received exercise instruction followed by patient demonstration of the exercises as below to decrease strength and AROM deficits, improve balance and ambulatory skills and decrease fall risk.   [facility] Portal  Evaluation: (  )   Date:  9/25/2017 Date:  9/28/2017 Date:  10-4-17 Date:  10-6-2017 Date:  10/9/2017     Activity/Exercise Parameters Parameters Parameters       MAT  EXERCISES:          Hip ER / Abduction  2 x 10        Hip Adduction (Ball Squeeze)          Bridging (Single Leg)  2 x 10        Straight Leg Raise          Standing Toe Raises    3 x 10      Hip Abduction (Sidelying)          Gluteus Medius (Clam Shell)          Sit To Stand   2 x 10                 STANDING HIP MACHINE:          Hip Abduction [email or web address] x 10(B) [email or web address] x 10(B) [email or web address] x 10(B)  [email or web address] x 10(B)     Hip Flexion [email or web address] x 10(B) [email or web address] x 10(B) [email or web address] x 10(B)  [email or web address] x 10(B)     Hip Extension [email or web address] x 10(B) [email or web address] x 10(B) [email or web address] x 10(B)  [email or web address] x 10(B)     Hip Adduction          Standing Knee Extension          4 Way Cable Walkout    [email or web address] x 10      Gluteus Medius (Crab Walk)          Knee Extension [email or web address] x 10(B) [email or web address] x 10(B) [email or web address] x 10(B) [email or web address] x 10(B) [email or web address] x 10(B)     Knee Flexion [email or web address]@[email or web address] x 10(B) [email or web address] x 10(B) [email or web address] x 10(B) [email or web address] x 10(B) [email or web address] x 10(B)     Step Downs  2 x 10(B) 2 x 10(B)  3 x 10(B)               Stationary Bike          Treadmill    5 min      NuStep  10 minutes 10 minutes 10 minutes 10 minutes     Single Leg Balance(Hand Touch)    5 min      FLEXIBILITY:          Heel Cord 3 min 3 min 3 min 3 min 3 min. Hamstring          Hip Flexors                      Manual Therapy (     ):   Therapeutic Modalities:                                                                                               HEP: As directed. Treatment/Session Assessment:    · Response to Treatment:  The patient tolerated today's treatment well today. The patient demonstrated good exercise effort, but was careful to avoid further symptom exacerbation for her back today. Patient needs to continue to work on her balance to improve her ambulation on uneven surfaces. Good motivation and compliance with home exercises. · Compliance with Program/Exercises: compliant all of the time. · Recommendations/Intent for next treatment session:  \"Next visit will focus on advancements to more challenging activities\".   Total Treatment Duration:   0900 0907    Gracia Marie., PT

## 2017-10-09 NOTE — PROGRESS NOTES
Alicia Norton  : 1950 [facility] at [facility] Therapy  [address], [facility], [address]  [phone]   [phone]       Alicia Norton  : 1950 [facility] at [place] [address] [facility], [address] [place] [address],[address], [facility] [address].  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 Fall Risk Score: 0 (? 5 = High Risk)  MD Orders: OT to address LUE weakness secondary to CVA MEDICAL/REFERRING DIAGNOSIS:   Lymphedema, not elsewhere classified [I89.0]   DATE OF ONSET: 2017   REFERRING PHYSICIAN: Margie Moe MD  RETURN PHYSICIAN APPOINTMENT: 2018     INITIAL ASSESSMENT:  Ms. Mina Osuna presents with decreased functional use, strength and range of motion of her left upper extremity that is affecting her independence with activities of daily living and ability to perform job tasks. I feel that Ms. Mina Osuna will benefit from skilled occupational therapy to maximize the functional use of her upper extremity in daily activities and work tasks. PLAN OF CARE:   PROBLEM LIST:  1. Decreased strength in LUE/hand  2. Decreased fine motor coordination in LUE/hand  3. Decreased functional use of LUE for functional tasks INTERVENTIONS PLANNED:  1. Modalities that may include fluidotherapy, paraffin, ultrasound, and light therapy. 2. Therapeutic exercise including a home exercise program.  3. Manual therapy. 4. Therapeutic activities. TREATMENT PLAN:  Effective Dates: 2017 TO 2017.   Frequency/Duration: 2 times a week for 8 weeks  GOALS: (Goals have been discussed and agreed upon with patient.)  Short-Term Functional Goals: Time Frame: 4 weeks GOALS MET  1. Increase LUE strength to 4/5 to allow improved functional use of LUE for tasks such as cutting fabric/gardening  2. Patient to be independent in [facility] for strengthening/coordination of LUE  3. Increase strength in L  by 10 pounds to allow patient to  and lift objects during self care activities. Discharge Goals: Time Frame: 8 weeks GOALS MET  1. Improve DASH rating by 6-8 points indicating overall improved functional use of LUE for self care/homeaking/hobbies  2. Increase motion in L shoulder girdle to [facility] to allow patient to perform all ADL activities. 3. Increase strength in L   by 15 pounds to allow patient to , lift, hold, and carry heavy objects. Rehabilitation Potential For Stated Goals: Excellent  Regarding Cheryl Caballero's therapy, I certify that the treatment plan above will be carried out by a therapist or under their direction. Thank you for this referral,  Ann Murrell OT       Referring Physician Signature: Toi Keyes MD _________________________  Date _________            The information in this section was collected on 9/11/2017 (except where otherwise noted). OCCUPATIONAL PROFILE & HISTORY:   History of Present Injury/Illness (Reason for Referral):  Patient report she had a probable R CVA on 8/29/2017 while on a cruise ship in [place]. She was taken to the [place] [address] and stayed there for 4 days until she returned to the [facility] on 9/2/2017. Since then she reports decreased hand/shoulder strength making it difficult for her to open containers, cut fabric and garden all of which she routinely performs. She has an MRI and US scheduled for 9/21/2017 to confirm CVA. Patient is also set to begin PT to address LLE weakness. Since this incident she uses a straight cane for ambulation and did not use one prior. Past Medical History/Comorbidities:     Ms. Eugene Garland  has a past medical history of Hypertension (1/8/2013) and Hypothyroidism (1/8/2013). Ms. Minerva Chamberlain  has a past surgical history that includes hysterectomy; cholecystectomy (2001); and tonsillectomy. Social History/Living Environment:    Patient lives with her  in a 1-story home. She and  travel frequently. Prior Level of Function/Work/Activity:  Retired   Dominant Side:         LEFT  Personal Factors:          Sex:  female        Age:  77 y.o. Current Medications:    Current Outpatient Prescriptions:     clopidogrel (PLAVIX) 75 mg tab, Take 1 Tab by mouth daily. This replaces any daily ASA., Disp: 90 Tab, Rfl: 3    benzonatate (TESSALON) 100 mg capsule, Take 1 Cap by mouth three (3) times daily as needed for Cough. , Disp: 30 Cap, Rfl: 2    albuterol (PROVENTIL HFA, VENTOLIN HFA, PROAIR HFA) 90 mcg/actuation inhaler, Take 2 Puffs by inhalation every four (4) hours as needed for Wheezing (cough). , Disp: 1 Inhaler, Rfl: 0    zolpidem (AMBIEN) 10 mg tablet, Take 1 Tab by mouth nightly as needed. , Disp: 90 Tab, Rfl: 1    SYNTHROID 100 mcg tablet, TAKE 1 TABLET DAILY BEFORE BREAKFAST, Disp: 90 Tab, Rfl: 3    cholecalciferol (VITAMIN D3) 1,000 unit tablet, Take  by mouth daily. , Disp: , Rfl:     raloxifene (EVISTA) 60 mg tablet, TAKE 1 TABLET DAILY, Disp: 90 Tab, Rfl: 3    cyclobenzaprine (FLEXERIL) 10 mg tablet, Take 1 Tab by mouth three (3) times daily as needed for Muscle Spasm(s). , Disp: 30 Tab, Rfl: 1    HYDROcodone-acetaminophen (NORCO) 5-325 mg per tablet, Take 1 Tab by mouth every six (6) hours as needed for Pain. Max Daily Amount: 4 Tabs., Disp: 20 Tab, Rfl: 0    valsartan-hydroCHLOROthiazide (DIOVAN-HCT) 160-25 mg per tablet, TAKE 1 TABLET DAILY, Disp: 90 Tab, Rfl: 3    GLUCOSAMINE HCL/CHONDR PÉREZ A NA (GLUCOSAMINE-CHONDROITIN) 750-600 mg tab, Take  by mouth., Disp: , Rfl:     alclometasone (ACLOVATE) 0.05 % topical cream, Apply  to affected area two (2) times a day. apply thin layer as directed, Disp: 60 g, Rfl: 0    aspirin 81 mg tablet, Take 81 mg by mouth. Indications: taking 3 tablets. , Disp: , Rfl:     calcium 500 mg Tab, Take  by mouth., Disp: , Rfl:     potassium 99 mg tablet, Take 99 mg by mouth daily as needed. , Disp: , Rfl:    Date Last Reviewed:  10/9/2017   Number of medical conditions (excluding presenting problem) that affect the Plan of Care: Expanded review of therapy/medical records (1-2):  MODERATE COMPLEXITY   ASSESSMENT OF OCCUPATIONAL PERFORMANCE:   RANGE OF MOTION:     · AROM: RUE WFL  ·             LUE WNL           · PROM: WFL BUESTHER's          STRENGTH:  RUE 4+/5                      LUE shoulder girdle 4+/5, elbow/forearm 4+/5                          R 64#   L 69#                      Tip   R 8#    L 13#                      Lat. Pinch  R 11#    L  19#                      3 jaw amadou   R 12#   L16#      SENSATION:  Intact CANDIDO's    COORDINATION:  9 hole peg test   R 23 seconds    L 22 seconds                              Patient has difficulty opening tight jars, cutting fabric, garden and cut food using L hand. She is otherwise independent in self care skills. Physical Skills Involved:  1. Range of Motion  2. Strength  3. Fine Motor Control Cognitive Skills Affected (resulting in the inability to perform in a timely and safe manner):  1. Psychosocial Skills Affected:  1. Habits/Routines   Number of elements that affect the Plan of Care: 3-5:  MODERATE COMPLEXITY   CLINICAL DECISION MAKING:   Outcome Measure: Tool Used: Disabilities of the Arm, Shoulder and Hand (DASH) Questionnaire - Quick Version  Score:  Initial: 25/55  Most Recent: 12/55  10/9/17   Interpretation of Score: The DASH is designed to measure the activities of daily living in person's with upper extremity dysfunction or pain. Each section is scored on a 1-5 scale, 5 representing the greatest disability. The scores of each section are added together for a total score of 55.     Score 11 12-19 20-28 29-37 38-45 46-54 55   Modifier CH CI CJ CK CL CM CN ? Carrying, Moving, and Handling Objects:     - CURRENT STATUS: CJ - 20%-39% impaired, limited or restricted    - GOAL STATUS: CI - 1%-19% impaired, limited or restricted    - D/C STATUS:  CI - 1%-19% impaired, limited or restricted      Medical Necessity:   · Skilled intervention continues to be required due to LUE weakness as a result of a possible CVA. Reason for Services/Other Comments:  · Patient continues to require skilled intervention due to patient having difficulty completing functional tasks as a result of weakness in L dominant UE secondary to probable CVA. Clinical Decision-Making: MODERATE COMPLEXITY   TREATMENT:   (In addition to Assessment/Re-Assessment sessions the following treatments were rendered)  Pre-treatment Symptoms/Complaints:  Pt is performing HEP daily. She reports she improves daily and feels she may be ready for discharge from OT. Pain: Initial: Pain Intensity 1: 0  Post Session:  1:0     OT evaluation completed. Therapeutic exercise (45 minutes):  Patient completed  Ergometer for 10 minutes with light/medium resistance, dowel exercises in standing, 20 reps each x 3 sets with 3 lb weight stick:  Shoulder flex/ext, internal/external rotation, horizontal abd/adduction, scapular pro/retraction, forward and back liang large circles, green therabar 20 reps x2. Strength measured and improved as follows:  L  69#, L tip  Pinch 13#, L lateral pinch 19# & L 3 jaaw amadou 17#. LUE ROM now WNL and LUE strength 4+/5. Reviewed HEP and patient is independent. Treatment/Session Assessment:    · Response to Treatment:  Patients tolerated treatment well with no complications. Patient achieved all goals on evaluation and is ready for discharge to [facility]. · Compliance with Program/Exercises: excellent follow through with HEP  · Recommendations/Intent for next treatment session: Discharge to HEP.   Total Treatment Duration:  OT Patient Time In/Time Out  Time In: 0800  Time Out: Mark Alamo 54, OT

## 2017-10-12 ENCOUNTER — HOSPITAL ENCOUNTER (OUTPATIENT)
Dept: PHYSICAL THERAPY | Age: 67
Discharge: HOME | End: 2017-10-12
Payer: MEDICARE

## 2017-10-12 ENCOUNTER — APPOINTMENT (OUTPATIENT)
Dept: PHYSICAL THERAPY | Age: 67
End: 2017-10-12
Attending: INTERNAL MEDICINE
Payer: MEDICARE

## 2017-10-12 PROCEDURE — 97110 THERAPEUTIC EXERCISES: CPT

## 2017-10-12 NOTE — PROGRESS NOTES
Faby Zarate  : 1950 [facility] at [place] [address] Therapy  [address], [address]  [phone]   [phone]       OUTPATIENT PHYSICAL THERAPY:Daily Note 10/12/2017    ICD-10: Treatment Diagnosis:   G81.94 Hemiplegia, unspecified affecting left nondominant side   Precautions/Allergies:   Review of patient's allergies indicates no known allergies. Fall Risk Score: 1 (? 5 = High Risk)  MD Orders: Evaluate and treat MEDICAL/REFERRING DIAGNOSIS:  Cerebral infarction, unspecified [I63.9]  Weakness [R53.1]   DATE OF ONSET: 2017  REFERRING PHYSICIAN: Tiny Topete MD  RETURN PHYSICIAN APPOINTMENT: TBD     INITIAL ASSESSMENT:  Ms. Minerva Chamberlain reported an onset of L UE and LE hemiparesis on 2017 while on a cruise in the  [facility]. She returned home an reported a recent MRI she is awaiting for results. She is ambulating with a cane and L foot drop and reports she fatigues easily with prolonged activity. She was referred to physical therapy for care. PROBLEM LIST (Impacting functional limitations):  1. Decreased Strength  2. Decreased Ambulation Ability/Technique  3. Decreased Activity Tolerance INTERVENTIONS PLANNED:  1. Neuromuscular Re-education/Strengthening  2. Range of Motion (ROM)  3. Therapeutic Exercise/Strengthening   TREATMENT PLAN:  Effective Dates: 2017 TO 2017. Frequency/Duration: 2 times a week for 8 weeks  GOALS: (Goals have been discussed and agreed upon with patient.)  Short-Term Functional Goals: Time Frame: 4 weeks. 1. Increase LEFS 4 points to decrease fall risk. 2. Increase dorsiflexion 5 degrees to decrease fall risk. 3. Increase L single leg balance 1-2 seconds to decrease fall risk. Discharge Goals: Time Frame: 8 weeks. 1. INcrease LEFS 9 points to decrease fall risk. 2. Increase dorsiflexion 10 degrees to decrease fall risk.    3. Demonstrate independence in home exercises to allow DC from physical therapy. Rehabilitation Potential For Stated Goals: Good              The information in this section was collected on 9/25/2017 (except where otherwise noted). HISTORY:   History of Present Injury/Illness (Reason for Referral): The patient reported an onset of L UE and LE hemiparesis on 8/29/2017 while on a cruise in the [facility]. She returned home an reported a recent MRI she is awaiting for results. She is ambulating with a cane and L foot drop and reports she fatigues easily with prolonged activity. She was referred to physical therapy for care. Past Medical History/Comorbidities:   Ms. Olimpia Mejia  has a past medical history of Hypertension (1/8/2013) and Hypothyroidism (1/8/2013). Ms. Olimpia Mejia  has a past surgical history that includes hysterectomy; cholecystectomy (2001); and tonsillectomy. Social History/Living Environment:    Independent,   Prior Level of Function/Work/Activity:  Retired  Dominant Side:         RIGHT    Current Medications:       Current Outpatient Prescriptions:     clopidogrel (PLAVIX) 75 mg tab, Take 1 Tab by mouth daily. This replaces any daily ASA., Disp: 90 Tab, Rfl: 3    benzonatate (TESSALON) 100 mg capsule, Take 1 Cap by mouth three (3) times daily as needed for Cough. , Disp: 30 Cap, Rfl: 2    albuterol (PROVENTIL HFA, VENTOLIN HFA, PROAIR HFA) 90 mcg/actuation inhaler, Take 2 Puffs by inhalation every four (4) hours as needed for Wheezing (cough). , Disp: 1 Inhaler, Rfl: 0    zolpidem (AMBIEN) 10 mg tablet, Take 1 Tab by mouth nightly as needed. , Disp: 90 Tab, Rfl: 1    SYNTHROID 100 mcg tablet, TAKE 1 TABLET DAILY BEFORE BREAKFAST, Disp: 90 Tab, Rfl: 3    cholecalciferol (VITAMIN D3) 1,000 unit tablet, Take  by mouth daily. , Disp: , Rfl:     raloxifene (EVISTA) 60 mg tablet, TAKE 1 TABLET DAILY, Disp: 90 Tab, Rfl: 3    cyclobenzaprine (FLEXERIL) 10 mg tablet, Take 1 Tab by mouth three (3) times daily as needed for Muscle Spasm(s). , Disp: 30 Tab, Rfl: 1   HYDROcodone-acetaminophen (NORCO) 5-325 mg per tablet, Take 1 Tab by mouth every six (6) hours as needed for Pain. Max Daily Amount: 4 Tabs., Disp: 20 Tab, Rfl: 0    valsartan-hydroCHLOROthiazide (DIOVAN-HCT) 160-25 mg per tablet, TAKE 1 TABLET DAILY, Disp: 90 Tab, Rfl: 3    GLUCOSAMINE HCL/CHONDR PÉREZ A NA (GLUCOSAMINE-CHONDROITIN) 750-600 mg tab, Take  by mouth., Disp: , Rfl:     alclometasone (ACLOVATE) 0.05 % topical cream, Apply  to affected area two (2) times a day. apply thin layer as directed, Disp: 60 g, Rfl: 0    aspirin 81 mg tablet, Take 81 mg by mouth. Indications: taking 3 tablets. , Disp: , Rfl:     calcium 500 mg Tab, Take  by mouth., Disp: , Rfl:     potassium 99 mg tablet, Take 99 mg by mouth daily as needed. , Disp: , Rfl:    Date Last Reviewed:  10/12/2017   Number of Personal Factors/Comorbidities that affect the Plan of Care: 1-2: MODERATE COMPLEXITY   EXAMINATION:   SINGLE LEG BALANCE:  R  +10     seconds,   L  2    seconds  Observation/Orthostatic Postural Assessment:   L LE limp with a L foot drop. Palpation:   No specific c/o of pain. ROM: PROM : ANKLE   90 degrees dorsiflexion bilaterally (9/25)                       Strength:    +3/5 L LE Strength   +4/5 R LE Strength           Special Tests: N/A  Neurological Screen: N/A  Functional Mobility: Independent with cane. Balance:  Fair. Body Structures Involved:  1. Muscles Body Functions Affected:  1. Sensory/Pain  2. Neuromusculoskeletal  3. Movement Related Activities and Participation Affected:  1. General Tasks and Demands  2. Mobility  3. Community, Social and [place]   Number of elements (examined above) that affect the Plan of Care: 3: MODERATE COMPLEXITY   CLINICAL PRESENTATION:   Presentation: Evolving clinical presentation with changing clinical characteristics: MODERATE COMPLEXITY   CLINICAL DECISION MAKING:   Outcome Measure:    Tool Used: Lower Extremity Functional Scale (LEFS)  Score:  Initial: 49/80 Most Recent: X/80 (Date: -- )   Interpretation of Score: 20 questions each scored on a 5 point scale with 0 representing \"extreme difficulty or unable to perform\" and 4 representing \"no difficulty\". The lower the score, the greater the functional disability. 80/80 represents no disability. Minimal detectable change is 9 points. Score 80 79-63 62-48 47-32 31-16 15-1 0   Modifier CH CI CJ CK CL CM CN     ? Mobility - Walking and Moving Around:     - CURRENT STATUS: CJ - 20%-39% impaired, limited or restricted    - GOAL STATUS: CI - 1%-19% impaired, limited or restricted    - D/C STATUS:  ---------------To be determined---------------      Medical Necessity:   · Patient demonstrates good rehab potential due to higher previous functional level. Reason for Services/Other Comments:  · Patient continues to require skilled intervention due to increase L side LE strength to allow safe weight bearing ADLs and decrease fall risk. .   Use of outcome tool(s) and clinical judgement create a POC that gives a: Questionable prediction of patient's progress: MODERATE COMPLEXITY            TREATMENT:   (In addition to Assessment/Re-Assessment sessions the following treatments were rendered)  Pre-treatment Symptoms/Complaints: Patient reported slowly improving L dorsiflexion. Pain: Initial: Pain Intensity 1: 0  Post Session:  1   Therapeutic Exercise: ( 60): The patient received treatment as below to improve mobility, strength and balance. Required moderate verbal and manual cues to promote proper body alignment, promote proper body posture and promote proper body mechanics. Progressed resistance, range and repetitions as indicated. The patient received exercise instruction followed by patient demonstration of the exercises as below to decrease strength and AROM deficits, improve balance and ambulatory skills and decrease fall risk.   [employer] Portal  Evaluation: (  )   Date:  9/25/2017 Date:  9/28/2017 Date:  10-4-17 Date:  10-6-2017 Date:  10/9/2017 Date 10/12/2017    Activity/Exercise Parameters Parameters Parameters       MAT  EXERCISES:          Hip ER / Abduction  2 x 10        Hip Adduction (Ball Squeeze)          Bridging (Single Leg)  2 x 10        Straight Leg Raise          Standing Toe Raises    3 x 10      Hip Abduction (Sidelying)          Gluteus Medius (Clam Shell)          Sit To Stand   2 x 10                 STANDING HIP MACHINE:          Hip Abduction [email or web address] x 10(B) [email or web address] x 10(B) [email or web address] x 10(B)  [email or web address] x 10(B) [email or web address] x 10(B)    Hip Flexion [email or web address] x 10(B) [email or web address] x 10(B) [email or web address] x 10(B)  [email or web address] x 10(B) [email or web address] x 10(B)    Hip Extension [email or web address] x 10(B) [email or web address] x 10(B) [email or web address] x 10(B)  [email or web address] x 10(B) [email or web address] x 10(B)    Hip Adduction          Standing Knee Extension          4 Way Cable Walkout    [email or web address] x 10      Gluteus Medius (Crab Walk)          Knee Extension [email or web address] x 10(B) [email or web address] x 10(B) [email or web address] x 10(B) [email or web address] x 10(B) [email or web address] x 10(B) [email or web address] x 10(B)    Knee Flexion [email or web address] x 10(B) [email or web address] x 10(B) [email or web address] x 10(B) [email or web address] x 10(B) [email or web address] x 10(B) [email or web address] x 10(B)    Step Downs  2 x 10(B) 2 x 10(B)  3 x 10(B) 3 x 10(B)              Stationary Bike          Treadmill    5 min      NuStep  10 minutes 10 minutes 10 minutes 10 minutes 10 minutes    Single Leg Balance(Hand Touch)    5 min      FLEXIBILITY:          Heel Cord 3 min 3 min 3 min 3 min 3 min. Hamstring          Hip Flexors                      Manual Therapy (     ):   Therapeutic Modalities:                                                                                               HEP: As directed. Treatment/Session Assessment:    · Response to Treatment:  The patient tolerated today's treatment well today. The patient demonstrated good exercise effort, and tolerated increased weights well with continued manual and verbal cuing. Patient needs to continue to work on her balance to improve her ambulation on uneven surfaces. Good motivation and compliance with home exercises.   · Compliance with Program/Exercises: compliant all of the time. · Recommendations/Intent for next treatment session: \"Next visit will focus on advancements to more challenging activities\".   Total Treatment Duration:   0900   30 Sam Mora, PT

## 2017-10-18 ENCOUNTER — HOSPITAL ENCOUNTER (OUTPATIENT)
Dept: PHYSICAL THERAPY | Age: 67
Discharge: HOME | End: 2017-10-18
Payer: MEDICARE

## 2017-10-18 ENCOUNTER — APPOINTMENT (OUTPATIENT)
Dept: PHYSICAL THERAPY | Age: 67
End: 2017-10-18
Attending: INTERNAL MEDICINE
Payer: MEDICARE

## 2017-10-18 PROCEDURE — 97110 THERAPEUTIC EXERCISES: CPT

## 2017-10-18 NOTE — PROGRESS NOTES
Angeels Ugalde  : 1950 [facility]  [address], [facility], [address]  [phone]   [phone]       OUTPATIENT PHYSICAL THERAPY:Daily Note 10/18/2017    ICD-10: Treatment Diagnosis:   G81.94 Hemiplegia, unspecified affecting left nondominant side   Precautions/Allergies:   Review of patient's allergies indicates no known allergies. Fall Risk Score: 1 (? 5 = High Risk)  MD Orders: Evaluate and treat MEDICAL/REFERRING DIAGNOSIS:  Cerebral infarction, unspecified [I63.9]  Weakness [R53.1]   DATE OF ONSET: 2017  REFERRING PHYSICIAN: Toi Keyes MD  RETURN PHYSICIAN APPOINTMENT: TBD     INITIAL ASSESSMENT:  Ms. Eugene Garland reported an onset of L UE and LE hemiparesis on 2017 while on a cruise in the  [facility]. She returned home an reported a recent MRI she is awaiting for results. She is ambulating with a cane and L foot drop and reports she fatigues easily with prolonged activity. She was referred to physical therapy for care. PROBLEM LIST (Impacting functional limitations):  1. Decreased Strength  2. Decreased Ambulation Ability/Technique  3. Decreased Activity Tolerance INTERVENTIONS PLANNED:  1. Neuromuscular Re-education/Strengthening  2. Range of Motion (ROM)  3. Therapeutic Exercise/Strengthening   TREATMENT PLAN:  Effective Dates: 2017 TO 2017. Frequency/Duration: 2 times a week for 8 weeks  GOALS: (Goals have been discussed and agreed upon with patient.)  Short-Term Functional Goals: Time Frame: 4 weeks. 1. Increase LEFS 4 points to decrease fall risk. 2. Increase dorsiflexion 5 degrees to decrease fall risk. 3. Increase L single leg balance 1-2 seconds to decrease fall risk. Discharge Goals: Time Frame: 8 weeks. 1. INcrease LEFS 9 points to decrease fall risk. 2. Increase dorsiflexion 10 degrees to decrease fall risk.    3. Demonstrate independence in home exercises to allow DC from physical therapy. Rehabilitation Potential For Stated Goals: Good              The information in this section was collected on 9/25/2017 (except where otherwise noted). HISTORY:   History of Present Injury/Illness (Reason for Referral): The patient reported an onset of L UE and LE hemiparesis on 8/29/2017 while on a cruise in the [facility]. She returned home an reported a recent MRI she is awaiting for results. She is ambulating with a cane and L foot drop and reports she fatigues easily with prolonged activity. She was referred to physical therapy for care. Past Medical History/Comorbidities:   Ms. Shant Burrows  has a past medical history of Hypertension (1/8/2013) and Hypothyroidism (1/8/2013). Ms. Shant Burrows  has a past surgical history that includes hysterectomy; cholecystectomy (2001); and tonsillectomy. Social History/Living Environment:    Independent,   Prior Level of Function/Work/Activity:  Retired  Dominant Side:         RIGHT    Current Medications:       Current Outpatient Prescriptions:     valsartan-hydroCHLOROthiazide (DIOVAN-HCT) 160-25 mg per tablet, TAKE 1 TABLET DAILY, Disp: 90 Tab, Rfl: 3    clopidogrel (PLAVIX) 75 mg tab, Take 1 Tab by mouth daily. This replaces any daily ASA., Disp: 90 Tab, Rfl: 3    benzonatate (TESSALON) 100 mg capsule, Take 1 Cap by mouth three (3) times daily as needed for Cough. , Disp: 30 Cap, Rfl: 2    albuterol (PROVENTIL HFA, VENTOLIN HFA, PROAIR HFA) 90 mcg/actuation inhaler, Take 2 Puffs by inhalation every four (4) hours as needed for Wheezing (cough). , Disp: 1 Inhaler, Rfl: 0    zolpidem (AMBIEN) 10 mg tablet, Take 1 Tab by mouth nightly as needed. , Disp: 90 Tab, Rfl: 1    SYNTHROID 100 mcg tablet, TAKE 1 TABLET DAILY BEFORE BREAKFAST, Disp: 90 Tab, Rfl: 3    cholecalciferol (VITAMIN D3) 1,000 unit tablet, Take  by mouth daily. , Disp: , Rfl:     raloxifene (EVISTA) 60 mg tablet, TAKE 1 TABLET DAILY, Disp: 90 Tab, Rfl: 3    cyclobenzaprine (FLEXERIL) 10 mg tablet, Take 1 Tab by mouth three (3) times daily as needed for Muscle Spasm(s). , Disp: 30 Tab, Rfl: 1    HYDROcodone-acetaminophen (NORCO) 5-325 mg per tablet, Take 1 Tab by mouth every six (6) hours as needed for Pain. Max Daily Amount: 4 Tabs., Disp: 20 Tab, Rfl: 0    GLUCOSAMINE HCL/CHONDR PÉREZ A NA (GLUCOSAMINE-CHONDROITIN) 750-600 mg tab, Take  by mouth., Disp: , Rfl:     alclometasone (ACLOVATE) 0.05 % topical cream, Apply  to affected area two (2) times a day. apply thin layer as directed, Disp: 60 g, Rfl: 0    aspirin 81 mg tablet, Take 81 mg by mouth. Indications: taking 3 tablets. , Disp: , Rfl:     calcium 500 mg Tab, Take  by mouth., Disp: , Rfl:     potassium 99 mg tablet, Take 99 mg by mouth daily as needed. , Disp: , Rfl:    Date Last Reviewed:  10/18/2017   Number of Personal Factors/Comorbidities that affect the Plan of Care: 1-2: MODERATE COMPLEXITY   EXAMINATION:   SINGLE LEG BALANCE:  R  +10     seconds,   L  2    seconds  Observation/Orthostatic Postural Assessment:   L LE limp with a L foot drop. Palpation:   No specific c/o of pain. ROM: PROM : ANKLE   90 degrees dorsiflexion bilaterally (9/25)                       Strength:    +3/5 L LE Strength   +4/5 R LE Strength           Special Tests: N/A  Neurological Screen: N/A  Functional Mobility: Independent with cane. Balance:  Fair. Body Structures Involved:  1. Muscles Body Functions Affected:  1. Sensory/Pain  2. Neuromusculoskeletal  3. Movement Related Activities and Participation Affected:  1. General Tasks and Demands  2. Mobility  3. Community, Social and [place]   Number of elements (examined above) that affect the Plan of Care: 3: MODERATE COMPLEXITY   CLINICAL PRESENTATION:   Presentation: Evolving clinical presentation with changing clinical characteristics: MODERATE COMPLEXITY   CLINICAL DECISION MAKING:   Outcome Measure:    Tool Used: Lower Extremity Functional Scale (LEFS)  Score:  Initial: 49/80 Most Recent: X/80 (Date: -- )   Interpretation of Score: 20 questions each scored on a 5 point scale with 0 representing \"extreme difficulty or unable to perform\" and 4 representing \"no difficulty\". The lower the score, the greater the functional disability. 80/80 represents no disability. Minimal detectable change is 9 points. Score 80 79-63 62-48 47-32 31-16 15-1 0   Modifier CH CI CJ CK CL CM CN     ? Mobility - Walking and Moving Around:     - CURRENT STATUS: CJ - 20%-39% impaired, limited or restricted    - GOAL STATUS: CI - 1%-19% impaired, limited or restricted    - D/C STATUS:  ---------------To be determined---------------      Medical Necessity:   · Patient demonstrates good rehab potential due to higher previous functional level. Reason for Services/Other Comments:  · Patient continues to require skilled intervention due to increase L side LE strength to allow safe weight bearing ADLs and decrease fall risk. .   Use of outcome tool(s) and clinical judgement create a POC that gives a: Questionable prediction of patient's progress: MODERATE COMPLEXITY            TREATMENT:   (In addition to Assessment/Re-Assessment sessions the following treatments were rendered)  Pre-treatment Symptoms/Complaints: Patient reported being able to perform limited yardwork this past weekend. Pain: Initial: Pain Intensity 1: 0  Post Session:  1   Therapeutic Exercise: ( 60): The patient received treatment as below to improve mobility, strength and balance. Required moderate verbal and manual cues to promote proper body alignment, promote proper body posture and promote proper body mechanics. Progressed resistance, range and repetitions as indicated. The patient received exercise instruction followed by patient demonstration of the exercises as below to decrease strength and AROM deficits, improve balance and ambulatory skills and decrease fall risk.   [employer] Portal  Evaluation: (  )   Date:  9/25/2017 Date:  9/28/2017 Date:  10-4-17 Date:  10-6-2017 Date:  10/9/2017 Date 10/12/2017 Date:  10/18/2017     Activity/Exercise Parameters Parameters Parameters         MAT  EXERCISES:            Hip ER / Abduction  2 x 10          Hip Adduction (Ball Squeeze)            Bridging (Single Leg)  2 x 10          Straight Leg Raise            Standing Toe Raises    3 x 10        Hip Abduction (Sidelying)            Gluteus Medius (Clam Shell)            Sit To Stand   2 x 10                     STANDING HIP MACHINE:            Hip Abduction [email or web address] x 10(B) [email or web address] x 10(B) [email or web address] x 10(B)  [email or web address] x 10(B) [email or web address] x 10(B) [email or web address] x 15(B)     Hip Flexion [email or web address] x 10(B) [email or web address] x 10(B) [email or web address] x 10(B)  [email or web address] x 10(B) [email or web address] x 10(B) [email or web address] x 15(B)     Hip Extension [email or web address] x 10(B) [email or web address] x 10(B) [email or web address] x 10(B)  [email or web address] x 10(B) [email or web address] x 10(B) [email or web address] x 15(B)     Hip Adduction            Standing Knee Extension            4 Way Cable Walkout    [email or web address] x 10   [email or web address] x 10     Gluteus Medius (Crab Walk)            Knee Extension [email or web address] x 10(B) [email or web address] x 10(B) [email or web address] x 10(B) [email or web address] x 10(B) [email or web address] x 10(B) [email or web address] x 10(B) [email or web address] x 15(B)     Knee Flexion [email or web address] x 10(B) [email or web address] x 10(B) [email or web address] x 10(B) [email or web address] x 10(B) [email or web address] x 10(B) [email or web address] x 10(B) [email or web address] x 15(B)     Step Downs  2 x 10(B) 2 x 10(B)  3 x 10(B) 3 x 10(B) 3 x 10(B)                 Stationary Bike            Treadmill    5 min        NuStep  10 minutes 10 minutes 10 minutes 10 minutes 10 minutes 10 minutes     Single Leg Balance(Hand Touch)    5 min        FLEXIBILITY:            Heel Cord 3 min 3 min 3 min 3 min 3 min. 3 min. Hamstring            Hip Flexors                          Manual Therapy (     ):   Therapeutic Modalities:                                                                                               HEP: As directed. Treatment/Session Assessment:    · Response to Treatment:  The patient tolerated today's treatment well today.   The patient demonstrated good exercise effort, and tolerated increased sets of exercises well with continued manual and verbal cuing. Patient needs to continue to work on her balance to improve her ambulation on uneven surfaces. Good motivation and compliance with home exercises. · Compliance with Program/Exercises: compliant all of the time. · Recommendations/Intent for next treatment session: \"Next visit will focus on advancements to more challenging activities\".   Total Treatment Duration:   0800   0900    Jennifer Batista, PT

## 2017-10-20 ENCOUNTER — APPOINTMENT (OUTPATIENT)
Dept: PHYSICAL THERAPY | Age: 67
End: 2017-10-20
Attending: INTERNAL MEDICINE
Payer: MEDICARE

## 2017-10-20 ENCOUNTER — APPOINTMENT (OUTPATIENT)
Dept: PHYSICAL THERAPY | Age: 67
End: 2017-10-20
Payer: MEDICARE

## 2017-10-23 ENCOUNTER — APPOINTMENT (OUTPATIENT)
Dept: PHYSICAL THERAPY | Age: 67
End: 2017-10-23
Attending: INTERNAL MEDICINE
Payer: MEDICARE

## 2017-10-23 ENCOUNTER — APPOINTMENT (OUTPATIENT)
Dept: PHYSICAL THERAPY | Age: 67
End: 2017-10-23
Payer: MEDICARE

## 2017-10-23 ENCOUNTER — HOSPITAL ENCOUNTER (OUTPATIENT)
Dept: PHYSICAL THERAPY | Age: 67
Discharge: HOME | End: 2017-10-23
Payer: MEDICARE

## 2017-10-23 PROCEDURE — 97110 THERAPEUTIC EXERCISES: CPT

## 2017-10-23 NOTE — PROGRESS NOTES
Michael Britton  : 1950 [facility]  [address], [facility], [address]  [phone]   [phone]       OUTPATIENT PHYSICAL THERAPY:Daily Note 10/23/2017    ICD-10: Treatment Diagnosis:   G81.94 Hemiplegia, unspecified affecting left nondominant side   Precautions/Allergies:   Review of patient's allergies indicates no known allergies. Fall Risk Score: 1 (? 5 = High Risk)  MD Orders: Evaluate and treat MEDICAL/REFERRING DIAGNOSIS:  Cerebral infarction, unspecified [I63.9]  Weakness [R53.1]   DATE OF ONSET: 2017  REFERRING PHYSICIAN: Precilla Dandy, MD  RETURN PHYSICIAN APPOINTMENT: TBD     INITIAL ASSESSMENT:  Ms. Jens Cantu reported an onset of L UE and LE hemiparesis on 2017 while on a cruise in the  [facility]. She returned home an reported a recent MRI she is awaiting for results. She is ambulating with a cane and L foot drop and reports she fatigues easily with prolonged activity. She was referred to physical therapy for care. PROBLEM LIST (Impacting functional limitations):  1. Decreased Strength  2. Decreased Ambulation Ability/Technique  3. Decreased Activity Tolerance INTERVENTIONS PLANNED:  1. Neuromuscular Re-education/Strengthening  2. Range of Motion (ROM)  3. Therapeutic Exercise/Strengthening   TREATMENT PLAN:  Effective Dates: 2017 TO 2017. Frequency/Duration: 2 times a week for 8 weeks  GOALS: (Goals have been discussed and agreed upon with patient.)  Short-Term Functional Goals: Time Frame: 4 weeks. 1. Increase LEFS 4 points to decrease fall risk. 2. Increase dorsiflexion 5 degrees to decrease fall risk. 3. Increase L single leg balance 1-2 seconds to decrease fall risk. Discharge Goals: Time Frame: 8 weeks. 1. INcrease LEFS 9 points to decrease fall risk. 2. Increase dorsiflexion 10 degrees to decrease fall risk.    3. Demonstrate independence in home exercises to allow DC from physical therapy. Rehabilitation Potential For Stated Goals: Good              The information in this section was collected on 9/25/2017 (except where otherwise noted). HISTORY:   History of Present Injury/Illness (Reason for Referral): The patient reported an onset of L UE and LE hemiparesis on 8/29/2017 while on a cruise in the [facility]. She returned home an reported a recent MRI she is awaiting for results. She is ambulating with a cane and L foot drop and reports she fatigues easily with prolonged activity. She was referred to physical therapy for care. Past Medical History/Comorbidities:   Ms. Purvi Sams  has a past medical history of Hypertension (1/8/2013) and Hypothyroidism (1/8/2013). Ms. Purvi Sams  has a past surgical history that includes hysterectomy; cholecystectomy (2001); and tonsillectomy. Social History/Living Environment:    Independent,   Prior Level of Function/Work/Activity:  Retired  Dominant Side:         RIGHT    Current Medications:       Current Outpatient Prescriptions:     valsartan-hydroCHLOROthiazide (DIOVAN-HCT) 160-25 mg per tablet, TAKE 1 TABLET DAILY, Disp: 90 Tab, Rfl: 3    clopidogrel (PLAVIX) 75 mg tab, Take 1 Tab by mouth daily. This replaces any daily ASA., Disp: 90 Tab, Rfl: 3    benzonatate (TESSALON) 100 mg capsule, Take 1 Cap by mouth three (3) times daily as needed for Cough. , Disp: 30 Cap, Rfl: 2    albuterol (PROVENTIL HFA, VENTOLIN HFA, PROAIR HFA) 90 mcg/actuation inhaler, Take 2 Puffs by inhalation every four (4) hours as needed for Wheezing (cough). , Disp: 1 Inhaler, Rfl: 0    zolpidem (AMBIEN) 10 mg tablet, Take 1 Tab by mouth nightly as needed. , Disp: 90 Tab, Rfl: 1    SYNTHROID 100 mcg tablet, TAKE 1 TABLET DAILY BEFORE BREAKFAST, Disp: 90 Tab, Rfl: 3    cholecalciferol (VITAMIN D3) 1,000 unit tablet, Take  by mouth daily. , Disp: , Rfl:     raloxifene (EVISTA) 60 mg tablet, TAKE 1 TABLET DAILY, Disp: 90 Tab, Rfl: 3    cyclobenzaprine (FLEXERIL) 10 mg tablet, Take 1 Tab by mouth three (3) times daily as needed for Muscle Spasm(s). , Disp: 30 Tab, Rfl: 1    HYDROcodone-acetaminophen (NORCO) 5-325 mg per tablet, Take 1 Tab by mouth every six (6) hours as needed for Pain. Max Daily Amount: 4 Tabs., Disp: 20 Tab, Rfl: 0    GLUCOSAMINE HCL/CHONDR PÉREZ A NA (GLUCOSAMINE-CHONDROITIN) 750-600 mg tab, Take  by mouth., Disp: , Rfl:     alclometasone (ACLOVATE) 0.05 % topical cream, Apply  to affected area two (2) times a day. apply thin layer as directed, Disp: 60 g, Rfl: 0    aspirin 81 mg tablet, Take 81 mg by mouth. Indications: taking 3 tablets. , Disp: , Rfl:     calcium 500 mg Tab, Take  by mouth., Disp: , Rfl:     potassium 99 mg tablet, Take 99 mg by mouth daily as needed. , Disp: , Rfl:    Date Last Reviewed:  10/23/2017   Number of Personal Factors/Comorbidities that affect the Plan of Care: 1-2: MODERATE COMPLEXITY   EXAMINATION:   SINGLE LEG BALANCE:  R  +10     seconds,   L  2    seconds  Observation/Orthostatic Postural Assessment:   L LE limp with a L foot drop. Palpation:   No specific c/o of pain. ROM: PROM : ANKLE   90 degrees dorsiflexion bilaterally (9/25)                       Strength:    +3/5 L LE Strength   +4/5 R LE Strength           Special Tests: N/A  Neurological Screen: N/A  Functional Mobility: Independent with cane. Balance:  Fair. Body Structures Involved:  1. Muscles Body Functions Affected:  1. Sensory/Pain  2. Neuromusculoskeletal  3. Movement Related Activities and Participation Affected:  1. General Tasks and Demands  2. Mobility  3. Community, Social and [place]   Number of elements (examined above) that affect the Plan of Care: 3: MODERATE COMPLEXITY   CLINICAL PRESENTATION:   Presentation: Evolving clinical presentation with changing clinical characteristics: MODERATE COMPLEXITY   CLINICAL DECISION MAKING:   Outcome Measure:    Tool Used: Lower Extremity Functional Scale (LEFS)  Score:  Initial: 49/80 Most Recent: X/80 (Date: -- )   Interpretation of Score: 20 questions each scored on a 5 point scale with 0 representing \"extreme difficulty or unable to perform\" and 4 representing \"no difficulty\". The lower the score, the greater the functional disability. 80/80 represents no disability. Minimal detectable change is 9 points. Score 80 79-63 62-48 47-32 31-16 15-1 0   Modifier CH CI CJ CK CL CM CN     ? Mobility - Walking and Moving Around:     - CURRENT STATUS: CJ - 20%-39% impaired, limited or restricted    - GOAL STATUS: CI - 1%-19% impaired, limited or restricted    - D/C STATUS:  ---------------To be determined---------------      Medical Necessity:   · Patient demonstrates good rehab potential due to higher previous functional level. Reason for Services/Other Comments:  · Patient continues to require skilled intervention due to increase L side LE strength to allow safe weight bearing ADLs and decrease fall risk. .   Use of outcome tool(s) and clinical judgement create a POC that gives a: Questionable prediction of patient's progress: MODERATE COMPLEXITY            TREATMENT:   (In addition to Assessment/Re-Assessment sessions the following treatments were rendered)  Pre-treatment Symptoms/Complaints: Patient reported increased ability with ADLs. Pain: Initial: Pain Intensity 1: 0  Post Session:  1   Therapeutic Exercise: ( 60): The patient received treatment as below to improve mobility, strength and balance. Required moderate verbal and manual cues to promote proper body alignment, promote proper body posture and promote proper body mechanics. Progressed resistance, range and repetitions as indicated. The patient received exercise instruction followed by patient demonstration of the exercises as below to decrease strength and AROM deficits, improve balance and ambulatory skills and decrease fall risk.   [employer] Portal  Evaluation: (  )   Date:  9/25/2017 Date:  9/28/2017 Date:  10-4-17 Date:  10-6-2017 Date:  10/9/2017 Date 10/12/2017 Date:  10/18/2017 Date:  10/23/2017    Activity/Exercise Parameters Parameters Parameters         MAT  EXERCISES:            Hip ER / Abduction  2 x 10          Hip Adduction (Ball Squeeze)            Bridging (Single Leg)  2 x 10          Straight Leg Raise            Standing Toe Raises    3 x 10        Hip Abduction (Sidelying)            Gluteus Medius (Clam Shell)            Sit To Stand   2 x 10                     STANDING HIP MACHINE:            Hip Abduction [email or web address] x 10(B) [email or web address] x 10(B) [email or web address] x 10(B)  [email or web address] x 10(B) [email or web address] x 10(B) [email or web address] x 15(B) [email or web address] x 15(B)    Hip Flexion [email or web address] x 10(B) [email or web address] x 10(B) [email or web address] x 10(B)  [email or web address] x 10(B) [email or web address] x 10(B) [email or web address] x 15(B) [email or web address] x 15(B)    Hip Extension [email or web address] x 10(B) [email or web address] x 10(B) [email or web address] x 10(B)  [email or web address] x 10(B) [email or web address] x 10(B) [email or web address] x 15(B) [email or web address] x 15(B)    Hip Adduction            Standing Knee Extension            4 Way Cable Walkout    [email or web address] x 10   [email or web address] x 10 [email or web address] x 10    Gluteus Medius (Crab Walk)            Knee Extension [email or web address] x 10(B) [email or web address] x 10(B) [email or web address] x 10(B) [email or web address] x 10(B) [email or web address] x 10(B) [email or web address] x 10(B) [email or web address] x 15(B) [email or web address] x 15(B)    Knee Flexion [email or web address] x 10(B) [email or web address] x 10(B) [email or web address] x 10(B) [email or web address] x 10(B) [email or web address] x 10(B) [email or web address] x 10(B) [email or web address] x 15(B) [email or web address] x 15(B)    Step Downs  2 x 10(B) 2 x 10(B)  3 x 10(B) 3 x 10(B) 3 x 10(B)                 Stationary Bike            Treadmill    5 min        NuStep  10 minutes 10 minutes 10 minutes 10 minutes 10 minutes 10 minutes 10 minutes    Single Leg Balance(Hand Touch)    5 min        FLEXIBILITY:            Heel Cord 3 min 3 min 3 min 3 min 3 min. 3 min. 3 min    Hamstring            Hip Flexors                          Manual Therapy (     ):   Therapeutic Modalities:                                                                                               HEP: As directed. Treatment/Session Assessment:    · Response to Treatment:  The patient tolerated today's treatment well today.   The patient demonstrated good exercise effort, and tolerated the exercises well with continued manual and verbal cuing. Patient needs to continue to work on her balance to improve her ambulation on uneven surfaces, but is very encouraged. Good motivation and compliance with home exercises. · Compliance with Program/Exercises: compliant all of the time. · Recommendations/Intent for next treatment session: \"Next visit will focus on advancements to more challenging activities\".   Total Treatment Duration:   0800   0900    Shalom Perrin., PT

## 2017-10-26 ENCOUNTER — APPOINTMENT (OUTPATIENT)
Dept: PHYSICAL THERAPY | Age: 67
End: 2017-10-26
Payer: MEDICARE

## 2017-10-26 ENCOUNTER — APPOINTMENT (OUTPATIENT)
Dept: PHYSICAL THERAPY | Age: 67
End: 2017-10-26
Attending: INTERNAL MEDICINE
Payer: MEDICARE

## 2017-10-30 ENCOUNTER — APPOINTMENT (OUTPATIENT)
Dept: PHYSICAL THERAPY | Age: 67
End: 2017-10-30
Payer: MEDICARE

## 2017-10-30 ENCOUNTER — APPOINTMENT (OUTPATIENT)
Dept: PHYSICAL THERAPY | Age: 67
End: 2017-10-30
Attending: INTERNAL MEDICINE
Payer: MEDICARE

## 2017-10-30 ENCOUNTER — HOSPITAL ENCOUNTER (OUTPATIENT)
Dept: PHYSICAL THERAPY | Age: 67
Discharge: HOME | End: 2017-10-30
Payer: MEDICARE

## 2017-10-30 PROCEDURE — 97110 THERAPEUTIC EXERCISES: CPT

## 2017-10-30 PROCEDURE — G8980 MOBILITY D/C STATUS: HCPCS

## 2017-10-30 PROCEDURE — G8979 MOBILITY GOAL STATUS: HCPCS

## 2017-10-30 NOTE — PROGRESS NOTES
Courtney Kiarra  : 1950 [facility]  [address], [facility], [address]  [phone]   [phone]       OUTPATIENT PHYSICAL THERAPY:Daily Note and Discharge 10/30/2017    ICD-10: Treatment Diagnosis:   G81.94 Hemiplegia, unspecified affecting left nondominant side   Precautions/Allergies:   Review of patient's allergies indicates no known allergies. Fall Risk Score: 1 (? 5 = High Risk)  MD Orders: Evaluate and treat MEDICAL/REFERRING DIAGNOSIS:  Cerebral infarction, unspecified [I63.9]  Weakness [R53.1]   DATE OF ONSET: 2017  REFERRING PHYSICIAN: Jose Gale MD  RETURN PHYSICIAN APPOINTMENT: WILVER     INITIAL ASSESSMENT:  Ms. Suraj Quiñones reported an onset of L UE and LE hemiparesis on 2017 while on a cruise in the  [facility]. She returned home an reported a recent MRI she is awaiting for results. She is ambulating with a cane and L foot drop and reports she fatigues easily with prolonged activity. She was referred to physical therapy for care. PROBLEM LIST (Impacting functional limitations):  1. Decreased Strength  2. Decreased Ambulation Ability/Technique  3. Decreased Activity Tolerance INTERVENTIONS PLANNED:  1. Neuromuscular Re-education/Strengthening  2. Range of Motion (ROM)  3. Therapeutic Exercise/Strengthening   TREATMENT PLAN:  Effective Dates: 2017 TO 2017. Frequency/Duration: 2 times a week for 8 weeks  GOALS: (Goals have been discussed and agreed upon with patient.)                                                     ALL GOALS ACHIEVED  Short-Term Functional Goals: Time Frame: 4 weeks. 1. Increase LEFS 4 points to decrease fall risk. 2. Increase dorsiflexion 5 degrees to decrease fall risk. 3. Increase L single leg balance 1-2 seconds to decrease fall risk. Discharge Goals: Time Frame: 8 weeks. 1. INcrease LEFS 9 points to decrease fall risk.   2. Increase dorsiflexion 10 degrees to decrease fall risk. 3. Demonstrate independence in home exercises to allow DC from physical therapy. Rehabilitation Potential For Stated Goals: Good              The information in this section was collected on 9/25/2017 (except where otherwise noted). HISTORY:   History of Present Injury/Illness (Reason for Referral): The patient reported an onset of L UE and LE hemiparesis on 8/29/2017 while on a cruise in the [facility]. She returned home an reported a recent MRI she is awaiting for results. She is ambulating with a cane and L foot drop and reports she fatigues easily with prolonged activity. She was referred to physical therapy for care. Past Medical History/Comorbidities:   Ms. Joaqunia Oseguera  has a past medical history of Hypertension (1/8/2013) and Hypothyroidism (1/8/2013). Ms. Joaquina Oseguera  has a past surgical history that includes hysterectomy; cholecystectomy (2001); and tonsillectomy. Social History/Living Environment:    Independent,   Prior Level of Function/Work/Activity:  Retired  Dominant Side:         RIGHT    Current Medications:       Current Outpatient Prescriptions:     valsartan-hydroCHLOROthiazide (DIOVAN-HCT) 160-25 mg per tablet, TAKE 1 TABLET DAILY, Disp: 90 Tab, Rfl: 3    clopidogrel (PLAVIX) 75 mg tab, Take 1 Tab by mouth daily. This replaces any daily ASA., Disp: 90 Tab, Rfl: 3    benzonatate (TESSALON) 100 mg capsule, Take 1 Cap by mouth three (3) times daily as needed for Cough. , Disp: 30 Cap, Rfl: 2    albuterol (PROVENTIL HFA, VENTOLIN HFA, PROAIR HFA) 90 mcg/actuation inhaler, Take 2 Puffs by inhalation every four (4) hours as needed for Wheezing (cough). , Disp: 1 Inhaler, Rfl: 0    zolpidem (AMBIEN) 10 mg tablet, Take 1 Tab by mouth nightly as needed. , Disp: 90 Tab, Rfl: 1    SYNTHROID 100 mcg tablet, TAKE 1 TABLET DAILY BEFORE BREAKFAST, Disp: 90 Tab, Rfl: 3    cholecalciferol (VITAMIN D3) 1,000 unit tablet, Take  by mouth daily. , Disp: , Rfl:     raloxifene (EVISTA) 60 mg tablet, TAKE 1 TABLET DAILY, Disp: 90 Tab, Rfl: 3    cyclobenzaprine (FLEXERIL) 10 mg tablet, Take 1 Tab by mouth three (3) times daily as needed for Muscle Spasm(s). , Disp: 30 Tab, Rfl: 1    HYDROcodone-acetaminophen (NORCO) 5-325 mg per tablet, Take 1 Tab by mouth every six (6) hours as needed for Pain. Max Daily Amount: 4 Tabs., Disp: 20 Tab, Rfl: 0    GLUCOSAMINE HCL/CHONDR PÉREZ A NA (GLUCOSAMINE-CHONDROITIN) 750-600 mg tab, Take  by mouth., Disp: , Rfl:     alclometasone (ACLOVATE) 0.05 % topical cream, Apply  to affected area two (2) times a day. apply thin layer as directed, Disp: 60 g, Rfl: 0    aspirin 81 mg tablet, Take 81 mg by mouth. Indications: taking 3 tablets. , Disp: , Rfl:     calcium 500 mg Tab, Take  by mouth., Disp: , Rfl:     potassium 99 mg tablet, Take 99 mg by mouth daily as needed. , Disp: , Rfl:    Date Last Reviewed:  10/30/2017   Number of Personal Factors/Comorbidities that affect the Plan of Care: 1-2: MODERATE COMPLEXITY   EXAMINATION:   SINGLE LEG BALANCE:  R  +10     seconds,   L  4   seconds  Observation/Orthostatic Postural Assessment:   Improved L LE limp with a L foot drop. Palpation:   No specific c/o of pain. ROM: PROM : ANKLE   90 degrees dorsiflexion bilaterally (9/25)   100 degrees dorsiflexion bilateral (10/30)                    Strength:                                               10/30   +3/5 L LE Strength               4/5   +4/5 R LE Strength              5/5           Special Tests: N/A  Neurological Screen: N/A  Functional Mobility: Independent with cane. Balance:  Fair. Body Structures Involved:  1. Muscles Body Functions Affected:  1. Sensory/Pain  2. Neuromusculoskeletal  3. Movement Related Activities and Participation Affected:  1. General Tasks and Demands  2. Mobility  3.  Community, Social and [place]   Number of elements (examined above) that affect the Plan of Care: 3: MODERATE COMPLEXITY   CLINICAL PRESENTATION:   Presentation: Evolving clinical presentation with changing clinical characteristics: MODERATE COMPLEXITY   CLINICAL DECISION MAKING:   Outcome Measure: Tool Used: Lower Extremity Functional Scale (LEFS)  Score:  Initial: 49/80 Most Recent: 71/80 (Date: 13/30/2017 )   Interpretation of Score: 20 questions each scored on a 5 point scale with 0 representing \"extreme difficulty or unable to perform\" and 4 representing \"no difficulty\". The lower the score, the greater the functional disability. 80/80 represents no disability. Minimal detectable change is 9 points. Score 80 79-63 62-48 47-32 31-16 15-1 0   Modifier CH CI CJ CK CL CM CN     ? Mobility - Walking and Moving Around:     - CURRENT STATUS: CI - 1%-19% impaired, limited or restricted    - GOAL STATUS: CI - 1%-19% impaired, limited or restricted    - D/C STATUS:  CI - 1%-19% impaired, limited or restricted      Medical Necessity:   · Patient demonstrates good rehab potential due to higher previous functional level. Reason for Services/Other Comments:  · Patient continues to require skilled intervention due to increase L side LE strength to allow safe weight bearing ADLs and decrease fall risk. .   Use of outcome tool(s) and clinical judgement create a POC that gives a: Questionable prediction of patient's progress: MODERATE COMPLEXITY            TREATMENT:   (In addition to Assessment/Re-Assessment sessions the following treatments were rendered)  Pre-treatment Symptoms/Complaints: Patient reported increased ability with ADLs. Pain: Initial: Pain Intensity 1: 0  Post Session:  0   Therapeutic Exercise: ( 60): The patient received treatment as below to improve mobility, strength and balance. Required moderate verbal and manual cues to promote proper body alignment, promote proper body posture and promote proper body mechanics. Progressed resistance, range and repetitions as indicated.   The patient received exercise instruction followed by patient demonstration of the exercises as below to decrease strength and AROM deficits, improve balance and ambulatory skills and decrease fall risk. [employer] Portal  Evaluation: (  )   Date:  9/25/2017 Date:  9/28/2017 Date:  10-4-17 Date:  10-6-2017 Date:  10/9/2017 Date 10/12/2017 Date:  10/18/2017 Date:  10/23/2017 Date:  10/30/2017   Activity/Exercise Parameters Parameters Parameters         MAT  EXERCISES:            Hip ER / Abduction  2 x 10          Hip Adduction (Ball Squeeze)            Bridging (Single Leg)  2 x 10          Straight Leg Raise            Standing Toe Raises    3 x 10        Hip Abduction (Sidelying)            Gluteus Medius (Clam Shell)            Sit To Stand   2 x 10                     STANDING HIP MACHINE:            Hip Abduction Man@[employer] x 10(B) Wendall@[email or web address] x 10(B) [email or web address] x 10(B)  Wendall@[employer] x 10(B) Mine@[email or web address] x 10(B) [email or web address] x 15(B) [email or web address] x 15(B) [email or web address] x 20(B)        Hip Flexion Man@[employer] x 10(B) Wendall@[employer] x 10(B) Wendall@[employer] x 10(B)  Wendall@[employer] x 10(B) Mine@[employer] x 10(B) [email or web address] x 15(B) [email or web address] x 15(B) [email or web address] x 20(B)         Hip Extension [email or web address] x 10(B) [email or web address] x 10(B) [email or web address] x 10(B)  Mine@[employer] x 10(B) [email or web address] x 10(B) [email or web address] x 15(B) [email or web address] x 15(B) [email or web address] x 20(B)      Hip Adduction            Standing Knee Extension            4 Way Cable Walkout    Spruce@[email or web address] x 10   [email or web address] x 10 [email or web address] x 10 [email or web address] x 10      Gluteus Medius (Crab Walk)            Knee Extension [email or web address]@[email or web address] x 10(B) [email or web address] x 10(B) [email or web address] x 10(B) [email or web address] x 10(B) [email or web address] x 10(B) [email or web address] x 10(B) [email or web address] x 15(B) [email or web address] x 15(B) [email or web address] x 20(B)      Knee Flexion [email or web address] x 10(B) [email or web address] x 10(B) Aetius@[email or web address] x 10(B) [email or web address]@[email or web address] x 10(B) [email or web address] x 10(B) [email or web address] x 10(B) [email or web address] x 15(B) [email or web address] x 15(B) [email or web address] x 20(B)      Step Downs  2 x 10(B) 2 x 10(B)  3 x 10(B) 3 x 10(B) 3 x 10(B)                 Stationary Bike            Treadmill    5 min        NuStep  10 minutes 10 minutes 10 minutes 10 minutes 10 minutes 10 minutes 10 minutes 10 minutes   Single Leg Balance(Hand Touch)    5 min        FLEXIBILITY:            Heel Cord 3 min 3 min 3 min 3 min 3 min. 3 min.  3 min 3 min   Hamstring            Hip Flexors                          Manual Therapy (     ):   Therapeutic Modalities:                                                                                               HEP: As directed. Treatment/Session Assessment:    · Response to Treatment:  The patient tolerated today's treatment well today. The patient demonstrated good exercise effort, and progress since beginning PT. She has achieved all of her goals and has been DC to a HEP. · Compliance with Program/Exercises: compliant all of the time. · Recommendations/Intent for next treatment session: DC to a HEP.   Total Treatment Duration:   0800   0900    Lisa Jones., PT

## 2017-11-02 ENCOUNTER — APPOINTMENT (OUTPATIENT)
Dept: PHYSICAL THERAPY | Age: 67
End: 2017-11-02
Attending: INTERNAL MEDICINE

## 2017-11-02 ENCOUNTER — APPOINTMENT (OUTPATIENT)
Dept: PHYSICAL THERAPY | Age: 67
End: 2017-11-02

## 2017-11-06 ENCOUNTER — APPOINTMENT (OUTPATIENT)
Dept: PHYSICAL THERAPY | Age: 67
End: 2017-11-06

## 2017-11-13 ENCOUNTER — APPOINTMENT (OUTPATIENT)
Dept: PHYSICAL THERAPY | Age: 67
End: 2017-11-13

## 2017-11-21 ENCOUNTER — APPOINTMENT (OUTPATIENT)
Dept: PHYSICAL THERAPY | Age: 67
End: 2017-11-21

## 2018-06-01 ENCOUNTER — HOSPITAL ENCOUNTER (OUTPATIENT)
Dept: ULTRASOUND IMAGING | Age: 68
Discharge: HOME | End: 2018-06-01
Attending: INTERNAL MEDICINE
Payer: MEDICARE

## 2018-06-01 DIAGNOSIS — I83.892 VARICOSE VEINS OF LEFT LEG WITH EDEMA: ICD-10-CM

## 2018-06-01 DIAGNOSIS — M25.462 SWELLING OF LEFT KNEE JOINT: ICD-10-CM

## 2018-06-01 DIAGNOSIS — M79.605 LEG PAIN, LATERAL, LEFT: ICD-10-CM

## 2018-06-01 PROCEDURE — 93971 EXTREMITY STUDY: CPT

## 2018-06-03 NOTE — PROGRESS NOTES
No blood clot as we suspected. US did not comment on a Bakers cyst either. If it has ruptured, it may not be readily visible on US. Let us know if she wants any further work-up (XRAY to check bones, etc). I do not think this is necessary unless things worsen.

## 2018-06-04 NOTE — PROGRESS NOTES
Spoke with pt informed her that no blood clot as we suspected. US did not comment on a Bakers cyst either. If it has ruptured, it may not be readily visible on US. Let us know if she wants any further work-up (XRAY to check bones, etc). Jase Bill does not think this is necessary unless things worsen.

## 2018-07-31 ENCOUNTER — APPOINTMENT (RX ONLY)
Dept: URBAN - METROPOLITAN AREA CLINIC 349 | Facility: CLINIC | Age: 68
Setting detail: DERMATOLOGY
End: 2018-07-31

## 2018-07-31 DIAGNOSIS — L57.8 OTHER SKIN CHANGES DUE TO CHRONIC EXPOSURE TO NONIONIZING RADIATION: ICD-10-CM

## 2018-07-31 DIAGNOSIS — L85.8 OTHER SPECIFIED EPIDERMAL THICKENING: ICD-10-CM

## 2018-07-31 DIAGNOSIS — L82.1 OTHER SEBORRHEIC KERATOSIS: ICD-10-CM

## 2018-07-31 DIAGNOSIS — I83.9 ASYMPTOMATIC VARICOSE VEINS OF LOWER EXTREMITIES: ICD-10-CM

## 2018-07-31 DIAGNOSIS — D18.0 HEMANGIOMA: ICD-10-CM

## 2018-07-31 DIAGNOSIS — L81.4 OTHER MELANIN HYPERPIGMENTATION: ICD-10-CM

## 2018-07-31 DIAGNOSIS — L57.0 ACTINIC KERATOSIS: ICD-10-CM

## 2018-07-31 DIAGNOSIS — I78.8 OTHER DISEASES OF CAPILLARIES: ICD-10-CM

## 2018-07-31 DIAGNOSIS — D22 MELANOCYTIC NEVI: ICD-10-CM

## 2018-07-31 PROBLEM — I83.93 ASYMPTOMATIC VARICOSE VEINS OF BILATERAL LOWER EXTREMITIES: Status: ACTIVE | Noted: 2018-07-31

## 2018-07-31 PROBLEM — D18.01 HEMANGIOMA OF SKIN AND SUBCUTANEOUS TISSUE: Status: ACTIVE | Noted: 2018-07-31

## 2018-07-31 PROBLEM — D22.71 MELANOCYTIC NEVI OF RIGHT LOWER LIMB, INCLUDING HIP: Status: ACTIVE | Noted: 2018-07-31

## 2018-07-31 PROBLEM — I10 ESSENTIAL (PRIMARY) HYPERTENSION: Status: ACTIVE | Noted: 2018-07-31

## 2018-07-31 PROCEDURE — 17000 DESTRUCT PREMALG LESION: CPT

## 2018-07-31 PROCEDURE — ? COUNSELING

## 2018-07-31 PROCEDURE — ? RECOMMENDATIONS

## 2018-07-31 PROCEDURE — 99213 OFFICE O/P EST LOW 20 MIN: CPT | Mod: 25

## 2018-07-31 PROCEDURE — ? LIQUID NITROGEN

## 2018-07-31 ASSESSMENT — LOCATION ZONE DERM
LOCATION ZONE: LEG
LOCATION ZONE: FACE
LOCATION ZONE: ARM
LOCATION ZONE: NECK
LOCATION ZONE: HAND
LOCATION ZONE: FEET
LOCATION ZONE: TRUNK
LOCATION ZONE: TOE
LOCATION ZONE: LIP

## 2018-07-31 ASSESSMENT — LOCATION DETAILED DESCRIPTION DERM
LOCATION DETAILED: LEFT CHIN
LOCATION DETAILED: RIGHT LATERAL SUPERIOR CHEST
LOCATION DETAILED: LEFT PROXIMAL DORSAL FOREARM
LOCATION DETAILED: INFERIOR THORACIC SPINE
LOCATION DETAILED: RIGHT DORSAL 3RD TOE
LOCATION DETAILED: RIGHT INFERIOR MEDIAL MIDBACK
LOCATION DETAILED: LEFT DISTAL DORSAL FOREARM
LOCATION DETAILED: RIGHT INFERIOR CENTRAL MALAR CHEEK
LOCATION DETAILED: RIGHT DISTAL PRETIBIAL REGION
LOCATION DETAILED: UPPER STERNUM
LOCATION DETAILED: LEFT INFERIOR MEDIAL FOREHEAD
LOCATION DETAILED: RIGHT INFERIOR ANTERIOR NECK
LOCATION DETAILED: RIGHT PROXIMAL DORSAL FOREARM
LOCATION DETAILED: LEFT UPPER CUTANEOUS LIP
LOCATION DETAILED: LEFT SUPERIOR UPPER BACK
LOCATION DETAILED: RIGHT RADIAL DORSAL HAND
LOCATION DETAILED: LEFT MEDIAL PLANTAR MIDFOOT
LOCATION DETAILED: RIGHT PROXIMAL PRETIBIAL REGION
LOCATION DETAILED: EPIGASTRIC SKIN
LOCATION DETAILED: LEFT ANTERIOR DISTAL THIGH
LOCATION DETAILED: LEFT INFERIOR CENTRAL MALAR CHEEK
LOCATION DETAILED: RIGHT MEDIAL PLANTAR MIDFOOT
LOCATION DETAILED: RIGHT CENTRAL MALAR CHEEK
LOCATION DETAILED: RIGHT DISTAL DORSAL FOREARM
LOCATION DETAILED: PERIUMBILICAL SKIN
LOCATION DETAILED: LEFT DISTAL POSTERIOR UPPER ARM
LOCATION DETAILED: LEFT ULNAR DORSAL HAND
LOCATION DETAILED: LEFT DISTAL PRETIBIAL REGION

## 2018-07-31 ASSESSMENT — LOCATION SIMPLE DESCRIPTION DERM
LOCATION SIMPLE: LEFT FOREARM
LOCATION SIMPLE: LEFT THIGH
LOCATION SIMPLE: LEFT PLANTAR SURFACE
LOCATION SIMPLE: RIGHT LOWER BACK
LOCATION SIMPLE: LEFT HAND
LOCATION SIMPLE: CHIN
LOCATION SIMPLE: RIGHT CHEEK
LOCATION SIMPLE: RIGHT HAND
LOCATION SIMPLE: LEFT POSTERIOR UPPER ARM
LOCATION SIMPLE: RIGHT 3RD TOE
LOCATION SIMPLE: LEFT UPPER BACK
LOCATION SIMPLE: LEFT PRETIBIAL REGION
LOCATION SIMPLE: LEFT FOREHEAD
LOCATION SIMPLE: ABDOMEN
LOCATION SIMPLE: RIGHT ANTERIOR NECK
LOCATION SIMPLE: RIGHT FOREARM
LOCATION SIMPLE: LEFT LIP
LOCATION SIMPLE: RIGHT PRETIBIAL REGION
LOCATION SIMPLE: RIGHT PLANTAR SURFACE
LOCATION SIMPLE: CHEST
LOCATION SIMPLE: UPPER BACK
LOCATION SIMPLE: LEFT CHEEK

## 2018-07-31 NOTE — HPI: RASH
How Severe Is Your Rash?: moderate
Is This A New Presentation, Or A Follow-Up?: Rash
Additional History: Comes and goes, itchy when present.

## 2018-07-31 NOTE — PROCEDURE: LIQUID NITROGEN
Number Of Freeze-Thaw Cycles: 2 freeze-thaw cycles
Detail Level: Detailed
Render Post-Care Instructions In Note?: no
Consent: The patient's consent was obtained including but not limited to risks of crusting, scabbing, blistering, scarring, darker or lighter pigmentary change, recurrence, incomplete removal and infection.
Duration Of Freeze Thaw-Cycle (Seconds): 3
Post-Care Instructions: I reviewed with the patient in detail post-care instructions. Patient is to wear sunprotection, and avoid picking at any of the treated lesions. Pt may apply Vaseline to crusted or scabbing areas.

## 2018-07-31 NOTE — PROCEDURE: RECOMMENDATIONS
Recommendations (Free Text): OTC hydrocortisone \\nAmlactin cream apply to affected area bedtime
Detail Level: Zone

## 2021-07-28 ENCOUNTER — HOSPITAL ENCOUNTER (OUTPATIENT)
Dept: LAB | Age: 71
Discharge: HOME | End: 2021-07-28

## 2021-07-28 PROCEDURE — 88305 TISSUE EXAM BY PATHOLOGIST: CPT

## 2023-05-10 RX ORDER — MAGNESIUM OXIDE 400 MG/1
400 TABLET ORAL DAILY
COMMUNITY

## 2023-05-10 RX ORDER — LEVOTHYROXINE SODIUM 0.1 MG/1
TABLET ORAL
COMMUNITY
Start: 2020-07-14

## 2023-05-10 RX ORDER — CYCLOBENZAPRINE HCL 10 MG
10 TABLET ORAL 3 TIMES DAILY PRN
COMMUNITY
Start: 2017-02-08

## 2023-05-10 RX ORDER — RALOXIFENE HYDROCHLORIDE 60 MG/1
1 TABLET, FILM COATED ORAL DAILY
COMMUNITY
Start: 2020-01-14

## 2023-05-10 RX ORDER — ZOLPIDEM TARTRATE 10 MG/1
10 TABLET ORAL
COMMUNITY
Start: 2020-07-07

## 2023-05-10 RX ORDER — MAGNESIUM CARB/ALUMINUM HYDROX 105-160MG
TABLET,CHEWABLE ORAL
COMMUNITY

## 2023-05-10 RX ORDER — VALSARTAN AND HYDROCHLOROTHIAZIDE 160; 25 MG/1; MG/1
0.5 TABLET ORAL DAILY
COMMUNITY
Start: 2020-07-07

## 2023-05-10 RX ORDER — ALBUTEROL SULFATE 90 UG/1
2 AEROSOL, METERED RESPIRATORY (INHALATION) EVERY 4 HOURS PRN
COMMUNITY
Start: 2017-09-08

## 2023-05-10 RX ORDER — CLOPIDOGREL BISULFATE 75 MG/1
TABLET ORAL
COMMUNITY
Start: 2020-09-19

## 2023-05-19 ENCOUNTER — HOSPITAL ENCOUNTER (INPATIENT)
Age: 73
LOS: 1 days | Discharge: HOME | DRG: 287 | End: 2023-05-20
Attending: EMERGENCY MEDICINE | Admitting: INTERNAL MEDICINE
Payer: MEDICARE

## 2023-05-19 ENCOUNTER — APPOINTMENT (OUTPATIENT)
Dept: GENERAL RADIOLOGY | Age: 73
DRG: 287 | End: 2023-05-19
Payer: MEDICARE

## 2023-05-19 ENCOUNTER — APPOINTMENT (OUTPATIENT)
Dept: CT IMAGING | Age: 73
DRG: 287 | End: 2023-05-19
Payer: MEDICARE

## 2023-05-19 DIAGNOSIS — R07.9 CHEST PAIN, UNSPECIFIED TYPE: Primary | ICD-10-CM

## 2023-05-19 DIAGNOSIS — R07.9 CHEST PAIN: ICD-10-CM

## 2023-05-19 LAB
ALBUMIN SERPL-MCNC: 3.3 G/DL (ref 3.2–4.6)
ALBUMIN/GLOB SERPL: 1 (ref 0.4–1.6)
ALP SERPL-CCNC: 75 U/L (ref 50–136)
ALT SERPL-CCNC: 26 U/L (ref 12–65)
ANION GAP SERPL CALC-SCNC: 6 MMOL/L (ref 2–11)
AST SERPL-CCNC: 22 U/L (ref 15–37)
BILIRUB SERPL-MCNC: 0.5 MG/DL (ref 0.2–1.1)
BUN SERPL-MCNC: 16 MG/DL (ref 8–23)
CALCIUM SERPL-MCNC: 8.8 MG/DL (ref 8.3–10.4)
CHLORIDE SERPL-SCNC: 107 MMOL/L (ref 101–110)
CO2 SERPL-SCNC: 27 MMOL/L (ref 21–32)
CREAT SERPL-MCNC: 1 MG/DL (ref 0.6–1)
D DIMER PPP FEU-MCNC: <0.27 UG/ML(FEU)
EKG ATRIAL RATE: 64 BPM
EKG DIAGNOSIS: NORMAL
EKG P AXIS: 70 DEGREES
EKG P-R INTERVAL: 214 MS
EKG Q-T INTERVAL: 414 MS
EKG QRS DURATION: 61 MS
EKG QTC CALCULATION (BAZETT): 428 MS
EKG R AXIS: 63 DEGREES
EKG T AXIS: 47 DEGREES
EKG VENTRICULAR RATE: 64 BPM
ERYTHROCYTE [DISTWIDTH] IN BLOOD BY AUTOMATED COUNT: 13 % (ref 11.9–14.6)
GLOBULIN SER CALC-MCNC: 3.4 G/DL (ref 2.8–4.5)
GLUCOSE SERPL-MCNC: 94 MG/DL (ref 65–100)
HCT VFR BLD AUTO: 43.2 % (ref 35.8–46.3)
HGB BLD-MCNC: 14.5 G/DL (ref 11.7–15.4)
MCH RBC QN AUTO: 30 PG (ref 26.1–32.9)
MCHC RBC AUTO-ENTMCNC: 33.6 G/DL (ref 31.4–35)
MCV RBC AUTO: 89.4 FL (ref 82–102)
NRBC # BLD: 0 K/UL (ref 0–0.2)
PLATELET # BLD AUTO: 267 K/UL (ref 150–450)
PMV BLD AUTO: 9.8 FL (ref 9.4–12.3)
POTASSIUM SERPL-SCNC: 3.9 MMOL/L (ref 3.5–5.1)
PROT SERPL-MCNC: 6.7 G/DL (ref 6.3–8.2)
RBC # BLD AUTO: 4.83 M/UL (ref 4.05–5.2)
SODIUM SERPL-SCNC: 140 MMOL/L (ref 133–143)
TROPONIN I SERPL HS-MCNC: 3.3 PG/ML (ref 0–37)
TROPONIN I SERPL HS-MCNC: 3.6 PG/ML (ref 0–37)
WBC # BLD AUTO: 9.1 K/UL (ref 4.3–11.1)

## 2023-05-19 PROCEDURE — 93005 ELECTROCARDIOGRAM TRACING: CPT | Performed by: EMERGENCY MEDICINE

## 2023-05-19 PROCEDURE — 99285 EMERGENCY DEPT VISIT HI MDM: CPT

## 2023-05-19 PROCEDURE — 71045 X-RAY EXAM CHEST 1 VIEW: CPT

## 2023-05-19 PROCEDURE — 85379 FIBRIN DEGRADATION QUANT: CPT

## 2023-05-19 PROCEDURE — 84484 ASSAY OF TROPONIN QUANT: CPT

## 2023-05-19 PROCEDURE — 2580000003 HC RX 258: Performed by: PHYSICIAN ASSISTANT

## 2023-05-19 PROCEDURE — 93010 ELECTROCARDIOGRAM REPORT: CPT | Performed by: INTERNAL MEDICINE

## 2023-05-19 PROCEDURE — 85027 COMPLETE CBC AUTOMATED: CPT

## 2023-05-19 PROCEDURE — 6370000000 HC RX 637 (ALT 250 FOR IP): Performed by: PHYSICIAN ASSISTANT

## 2023-05-19 PROCEDURE — 80053 COMPREHEN METABOLIC PANEL: CPT

## 2023-05-19 PROCEDURE — 1100000003 HC PRIVATE W/ TELEMETRY

## 2023-05-19 PROCEDURE — 70450 CT HEAD/BRAIN W/O DYE: CPT

## 2023-05-19 RX ORDER — VALSARTAN 160 MG/1
160 TABLET ORAL DAILY
Status: DISCONTINUED | OUTPATIENT
Start: 2023-05-19 | End: 2023-05-20 | Stop reason: HOSPADM

## 2023-05-19 RX ORDER — ACETAMINOPHEN 325 MG/1
650 TABLET ORAL EVERY 6 HOURS PRN
Status: DISCONTINUED | OUTPATIENT
Start: 2023-05-19 | End: 2023-05-20 | Stop reason: HOSPADM

## 2023-05-19 RX ORDER — NITROGLYCERIN 0.4 MG/1
0.4 TABLET SUBLINGUAL EVERY 5 MIN PRN
Status: DISCONTINUED | OUTPATIENT
Start: 2023-05-19 | End: 2023-05-20 | Stop reason: HOSPADM

## 2023-05-19 RX ORDER — MAGNESIUM OXIDE 400 MG/1
400 TABLET ORAL DAILY
Status: DISCONTINUED | OUTPATIENT
Start: 2023-05-19 | End: 2023-05-19

## 2023-05-19 RX ORDER — RALOXIFENE HYDROCHLORIDE 60 MG/1
60 TABLET, FILM COATED ORAL DAILY
Status: DISCONTINUED | OUTPATIENT
Start: 2023-05-19 | End: 2023-05-20 | Stop reason: HOSPADM

## 2023-05-19 RX ORDER — SODIUM CHLORIDE 0.9 % (FLUSH) 0.9 %
5-40 SYRINGE (ML) INJECTION EVERY 12 HOURS SCHEDULED
Status: DISCONTINUED | OUTPATIENT
Start: 2023-05-19 | End: 2023-05-20 | Stop reason: HOSPADM

## 2023-05-19 RX ORDER — POLYETHYLENE GLYCOL 3350 17 G/17G
17 POWDER, FOR SOLUTION ORAL DAILY PRN
Status: DISCONTINUED | OUTPATIENT
Start: 2023-05-19 | End: 2023-05-20 | Stop reason: HOSPADM

## 2023-05-19 RX ORDER — ALBUTEROL SULFATE 90 UG/1
2 AEROSOL, METERED RESPIRATORY (INHALATION) EVERY 4 HOURS PRN
Status: DISCONTINUED | OUTPATIENT
Start: 2023-05-19 | End: 2023-05-20 | Stop reason: HOSPADM

## 2023-05-19 RX ORDER — ASPIRIN 81 MG/1
81 TABLET, CHEWABLE ORAL DAILY
Status: DISCONTINUED | OUTPATIENT
Start: 2023-05-20 | End: 2023-05-20 | Stop reason: HOSPADM

## 2023-05-19 RX ORDER — LANOLIN ALCOHOL/MO/W.PET/CERES
400 CREAM (GRAM) TOPICAL DAILY
Status: DISCONTINUED | OUTPATIENT
Start: 2023-05-20 | End: 2023-05-20 | Stop reason: HOSPADM

## 2023-05-19 RX ORDER — ZOLPIDEM TARTRATE 5 MG/1
5 TABLET ORAL NIGHTLY PRN
Status: DISCONTINUED | OUTPATIENT
Start: 2023-05-19 | End: 2023-05-20 | Stop reason: HOSPADM

## 2023-05-19 RX ORDER — CYCLOBENZAPRINE HCL 10 MG
10 TABLET ORAL 3 TIMES DAILY PRN
Status: DISCONTINUED | OUTPATIENT
Start: 2023-05-19 | End: 2023-05-20 | Stop reason: HOSPADM

## 2023-05-19 RX ORDER — SODIUM CHLORIDE 9 MG/ML
INJECTION, SOLUTION INTRAVENOUS PRN
Status: DISCONTINUED | OUTPATIENT
Start: 2023-05-19 | End: 2023-05-20 | Stop reason: HOSPADM

## 2023-05-19 RX ORDER — LEVOTHYROXINE SODIUM 88 UG/1
88 TABLET ORAL DAILY
Status: DISCONTINUED | OUTPATIENT
Start: 2023-05-19 | End: 2023-05-20 | Stop reason: HOSPADM

## 2023-05-19 RX ORDER — SODIUM CHLORIDE 0.9 % (FLUSH) 0.9 %
5-40 SYRINGE (ML) INJECTION PRN
Status: DISCONTINUED | OUTPATIENT
Start: 2023-05-19 | End: 2023-05-20 | Stop reason: HOSPADM

## 2023-05-19 RX ORDER — ONDANSETRON 2 MG/ML
4 INJECTION INTRAMUSCULAR; INTRAVENOUS EVERY 6 HOURS PRN
Status: DISCONTINUED | OUTPATIENT
Start: 2023-05-19 | End: 2023-05-20 | Stop reason: HOSPADM

## 2023-05-19 RX ORDER — SODIUM CHLORIDE 9 MG/ML
INJECTION, SOLUTION INTRAVENOUS CONTINUOUS
Status: DISCONTINUED | OUTPATIENT
Start: 2023-05-20 | End: 2023-05-20 | Stop reason: HOSPADM

## 2023-05-19 RX ORDER — CLOPIDOGREL BISULFATE 75 MG/1
75 TABLET ORAL DAILY
Status: DISCONTINUED | OUTPATIENT
Start: 2023-05-19 | End: 2023-05-20 | Stop reason: HOSPADM

## 2023-05-19 RX ORDER — ONDANSETRON 4 MG/1
4 TABLET, ORALLY DISINTEGRATING ORAL EVERY 8 HOURS PRN
Status: DISCONTINUED | OUTPATIENT
Start: 2023-05-19 | End: 2023-05-20 | Stop reason: HOSPADM

## 2023-05-19 RX ADMIN — VALSARTAN 160 MG: 80 TABLET ORAL at 21:00

## 2023-05-19 RX ADMIN — ZOLPIDEM TARTRATE 5 MG: 5 TABLET ORAL at 21:46

## 2023-05-19 RX ADMIN — SODIUM CHLORIDE, PRESERVATIVE FREE 5 ML: 5 INJECTION INTRAVENOUS at 21:20

## 2023-05-19 RX ADMIN — CLOPIDOGREL BISULFATE 75 MG: 75 TABLET ORAL at 21:00

## 2023-05-19 ASSESSMENT — PAIN SCALES - GENERAL
PAINLEVEL_OUTOF10: 0
PAINLEVEL_OUTOF10: 4

## 2023-05-19 ASSESSMENT — ENCOUNTER SYMPTOMS
GASTROINTESTINAL NEGATIVE: 1
RESPIRATORY NEGATIVE: 1
BACK PAIN: 0

## 2023-05-19 ASSESSMENT — PAIN DESCRIPTION - PAIN TYPE: TYPE: ACUTE PAIN

## 2023-05-19 ASSESSMENT — PAIN DESCRIPTION - ORIENTATION: ORIENTATION: ANTERIOR

## 2023-05-19 ASSESSMENT — PAIN DESCRIPTION - LOCATION: LOCATION: CHEST

## 2023-05-19 ASSESSMENT — LIFESTYLE VARIABLES
HOW MANY STANDARD DRINKS CONTAINING ALCOHOL DO YOU HAVE ON A TYPICAL DAY: PATIENT DOES NOT DRINK
HOW OFTEN DO YOU HAVE A DRINK CONTAINING ALCOHOL: NEVER

## 2023-05-19 ASSESSMENT — PAIN DESCRIPTION - DESCRIPTORS: DESCRIPTORS: ACHING

## 2023-05-19 NOTE — ED NOTES
TRANSFER - OUT REPORT:    Verbal report given to JAX Ruiz on Nelia Baugh  being transferred to [id number] for ordered procedure       Report consisted of patient's Situation, Background, Assessment and   Recommendations(SBAR). Information from the following report(s) ED SBAR was reviewed with the receiving nurse. [place] Assessment: Presents to emergency department  because of falls (Syncope, seizure, or loss of consciousness): No, Age > 79: No, Altered Mental Status, Intoxication with alcohol or substance confusion (Disorientation, impaired judgment, poor safety awaremess, or inability to follow instructions): No, Impaired Mobility: Ambulates or transfers with assistive devices or assistance; Unable to ambulate or transer.: No, Nursing Judgement: No  Lines:   Peripheral IV 05/19/23 Right Antecubital (Active)        Opportunity for questions and clarification was provided.       Patient transported with:  Registered Nurse          Elba Abraham RN  05/19/23 [phone]

## 2023-05-19 NOTE — ED TRIAGE NOTES
Pt. Arrives to er via ems from home. Pt. Called for chest pain. Ems gave 324 mg or aspirin and x 2 doses of nitro. BP: 150/80, HR: 78, . 100% room air. Unremarkable 12 lead from EMS.

## 2023-05-19 NOTE — H&P
[facility] CARDIOLOGY History &Physical                 Primary Cardiologist: None    Primary Care Physician: Luigi Nieto MD    Admitting Physician: Dr Ronnie Saez:     Patient is a 67 y.o. female who presents with CP. She has a h/o htn, this AM at 10:15 while in the kitchen fixing a meal started having pain under her L breast, hard to describe, constant, initially 6-7/10, lasted an hour, not worse w exertion, without nausea, SOB or diaphoresis. She began researching causes of pain and then the pain went to her back and she became light headed and felt like her ankles were in shackles, the pain intensified and she came to the ER. No syncope. Cough recently w bronchitis, took guafensin this AM.  Pain eased w asa x 4. Now pain 1/10. No h/o CAD, CHF or arrhythmia. In ER CBC and Cmp wnl, HS trop 3.3, EKG NSR w rate 64 without St changes. /80. Head CT no acute process. Past cardiac eval:  2017 echo EF 58%    Soc: No tobacco   FH: Mom w atherosclerosis     Past Medical History:   Diagnosis Date    Hypertension 1/8/2013    Hypothyroidism 1/8/2013      Past Surgical History:   Procedure Laterality Date    CHOLECYSTECTOMY  2001    HYSTERECTOMY      Age 25 for uterine displacement    TONSILLECTOMY      Age 15      No Known Allergies  Social History     Tobacco Use    Smoking status: Never    Smokeless tobacco: Never   Substance Use Topics    Alcohol use:  Yes     Alcohol/week: 1.0 standard drink      FH:   Family History   Problem Relation Age of Onset    No Known Problems Brother     No Known Problems Brother     No Known Problems Brother     Cancer Mother         cervical    Hypertension Mother     No Known Problems Brother     No Known Problems Sister     Heart Disease Mother     Stroke Father         Review of Systems  General: no weight change, no weakness, fever or chills  Skin: no rashes, lumps, or other skin changes  HEENT: no headache, + allergic rhinitis  Neck: no swollen glands,

## 2023-05-19 NOTE — ED PROVIDER NOTES
Social History     Socioeconomic History    Marital status:    Tobacco Use    Smoking status: Never    Smokeless tobacco: Never   Substance and Sexual Activity    Alcohol use: Yes     Alcohol/week: 1.0 standard drink    Drug use: No        Previous Medications    ALBUTEROL SULFATE HFA (PROVENTIL;VENTOLIN;PROAIR) 108 (90 BASE) MCG/ACT INHALER    Inhale 2 puffs into the lungs every 4 hours as needed    CLOPIDOGREL (PLAVIX) 75 MG TABLET    TAKE 1 TABLET DAILY (THIS  REPLACES ANY DAILY ASPIRIN)    CYCLOBENZAPRINE (FLEXERIL) 10 MG TABLET    Take 1 tablet by mouth 3 times daily as needed    GLUCOSAMINE-CHONDROITIN 750-600 MG TABS TABLET    Take by mouth    LEVOTHYROXINE (SYNTHROID) 100 MCG TABLET    TAKE 1 TABLET DAILY BEFORE BREAKFAST    MAGNESIUM OXIDE (MAG-OX) 400 MG TABLET    Take 1 tablet by mouth daily    POTASSIUM GLUCONATE 550 MG TABLET    Take 99 mg by mouth daily as needed    RALOXIFENE (EVISTA) 60 MG TABLET    Take 1 tablet by mouth daily    VALSARTAN-HYDROCHLOROTHIAZIDE (DIOVAN-HCT) 160-25 MG PER TABLET    Take 0.5 tablets by mouth daily    ZOLPIDEM (AMBIEN) 10 MG TABLET    Take 1 tablet by mouth. Results for orders placed or performed during the hospital encounter of 05/19/23   XR CHEST PORTABLE    Narrative    EXAMINATION: XR CHEST PORTABLE 5/19/2023 2:21 PM    ACCESSION NUMBER: [id number]    COMPARISON: None     INDICATION: Chest Pain    TECHNIQUE: A single portable AP view of the chest was obtained. FINDINGS:   Cardiopericardial silhouette and vascularity within normal limits. No  infiltrative opacity, effusion, or pneumothorax. Osseous structures are  unremarkable. Impression    1. No acute process.       CT HEAD WO CONTRAST    Narrative    EXAMINATION: CT HEAD WO CONTRAST 5/19/2023 2:33 PM    ACCESSION NUMBER: [id number]    COMPARISON: Brain MRI September 21, 2017    INDICATION: ? left sided weakness compared to baseline    TECHNIQUE: Multiple-row detector helical CT

## 2023-05-20 VITALS
TEMPERATURE: 97.5 F | SYSTOLIC BLOOD PRESSURE: 137 MMHG | BODY MASS INDEX: 30.62 KG/M2 | WEIGHT: 202 LBS | DIASTOLIC BLOOD PRESSURE: 72 MMHG | HEART RATE: 73 BPM | HEIGHT: 68 IN | RESPIRATION RATE: 18 BRPM | OXYGEN SATURATION: 100 %

## 2023-05-20 LAB
ANION GAP SERPL CALC-SCNC: 4 MMOL/L (ref 2–11)
BUN SERPL-MCNC: 20 MG/DL (ref 8–23)
CALCIUM SERPL-MCNC: 8.4 MG/DL (ref 8.3–10.4)
CHLORIDE SERPL-SCNC: 111 MMOL/L (ref 101–110)
CHOLEST SERPL-MCNC: 151 MG/DL
CO2 SERPL-SCNC: 24 MMOL/L (ref 21–32)
CREAT SERPL-MCNC: 0.9 MG/DL (ref 0.6–1)
ECHO BSA: 2.09 M2
GLUCOSE SERPL-MCNC: 97 MG/DL (ref 65–100)
HDLC SERPL-MCNC: 57 MG/DL (ref 40–60)
HDLC SERPL: 2.6
LDLC SERPL CALC-MCNC: 75.6 MG/DL
MAGNESIUM SERPL-MCNC: 2.2 MG/DL (ref 1.8–2.4)
POTASSIUM SERPL-SCNC: 3.4 MMOL/L (ref 3.5–5.1)
SODIUM SERPL-SCNC: 139 MMOL/L (ref 133–143)
TRIGL SERPL-MCNC: 92 MG/DL (ref 35–150)
VLDLC SERPL CALC-MCNC: 18.4 MG/DL (ref 6–23)

## 2023-05-20 PROCEDURE — 83735 ASSAY OF MAGNESIUM: CPT

## 2023-05-20 PROCEDURE — 99152 MOD SED SAME PHYS/QHP 5/>YRS: CPT | Performed by: INTERNAL MEDICINE

## 2023-05-20 PROCEDURE — 6360000002 HC RX W HCPCS: Performed by: INTERNAL MEDICINE

## 2023-05-20 PROCEDURE — 2709999900 HC NON-CHARGEABLE SUPPLY: Performed by: INTERNAL MEDICINE

## 2023-05-20 PROCEDURE — 6360000004 HC RX CONTRAST MEDICATION: Performed by: INTERNAL MEDICINE

## 2023-05-20 PROCEDURE — B2111ZZ FLUOROSCOPY OF MULTIPLE CORONARY ARTERIES USING LOW OSMOLAR CONTRAST: ICD-10-PCS | Performed by: INTERNAL MEDICINE

## 2023-05-20 PROCEDURE — 36415 COLL VENOUS BLD VENIPUNCTURE: CPT

## 2023-05-20 PROCEDURE — B2151ZZ FLUOROSCOPY OF LEFT HEART USING LOW OSMOLAR CONTRAST: ICD-10-PCS | Performed by: INTERNAL MEDICINE

## 2023-05-20 PROCEDURE — 80048 BASIC METABOLIC PNL TOTAL CA: CPT

## 2023-05-20 PROCEDURE — 2580000003 HC RX 258: Performed by: PHYSICIAN ASSISTANT

## 2023-05-20 PROCEDURE — C1894 INTRO/SHEATH, NON-LASER: HCPCS | Performed by: INTERNAL MEDICINE

## 2023-05-20 PROCEDURE — 6370000000 HC RX 637 (ALT 250 FOR IP): Performed by: INTERNAL MEDICINE

## 2023-05-20 PROCEDURE — 6370000000 HC RX 637 (ALT 250 FOR IP): Performed by: PHYSICIAN ASSISTANT

## 2023-05-20 PROCEDURE — 80061 LIPID PANEL: CPT

## 2023-05-20 PROCEDURE — 4A023N7 MEASUREMENT OF CARDIAC SAMPLING AND PRESSURE, LEFT HEART, PERCUTANEOUS APPROACH: ICD-10-PCS | Performed by: INTERNAL MEDICINE

## 2023-05-20 PROCEDURE — 93458 L HRT ARTERY/VENTRICLE ANGIO: CPT | Performed by: INTERNAL MEDICINE

## 2023-05-20 PROCEDURE — C1769 GUIDE WIRE: HCPCS | Performed by: INTERNAL MEDICINE

## 2023-05-20 PROCEDURE — 2500000003 HC RX 250 WO HCPCS: Performed by: INTERNAL MEDICINE

## 2023-05-20 RX ORDER — HEPARIN SODIUM 200 [USP'U]/100ML
INJECTION, SOLUTION INTRAVENOUS CONTINUOUS PRN
Status: DISCONTINUED | OUTPATIENT
Start: 2023-05-20 | End: 2023-05-20 | Stop reason: HOSPADM

## 2023-05-20 RX ORDER — LIDOCAINE HYDROCHLORIDE 10 MG/ML
INJECTION, SOLUTION INFILTRATION; PERINEURAL PRN
Status: DISCONTINUED | OUTPATIENT
Start: 2023-05-20 | End: 2023-05-20 | Stop reason: HOSPADM

## 2023-05-20 RX ORDER — ROSUVASTATIN CALCIUM 20 MG/1
20 TABLET, COATED ORAL NIGHTLY
Qty: 30 TABLET | Refills: 3 | Status: SHIPPED | OUTPATIENT
Start: 2023-05-20

## 2023-05-20 RX ORDER — ROSUVASTATIN CALCIUM 20 MG/1
20 TABLET, COATED ORAL NIGHTLY
Status: DISCONTINUED | OUTPATIENT
Start: 2023-05-20 | End: 2023-05-20 | Stop reason: HOSPADM

## 2023-05-20 RX ORDER — ASPIRIN 81 MG/1
81 TABLET, CHEWABLE ORAL DAILY
Qty: 30 TABLET | Refills: 3 | Status: SHIPPED | OUTPATIENT
Start: 2023-05-21

## 2023-05-20 RX ORDER — AMLODIPINE BESYLATE 5 MG/1
5 TABLET ORAL DAILY
Status: DISCONTINUED | OUTPATIENT
Start: 2023-05-20 | End: 2023-05-20 | Stop reason: HOSPADM

## 2023-05-20 RX ORDER — NITROGLYCERIN 20 MG/100ML
INJECTION INTRAVENOUS PRN
Status: DISCONTINUED | OUTPATIENT
Start: 2023-05-20 | End: 2023-05-20 | Stop reason: HOSPADM

## 2023-05-20 RX ORDER — MIDAZOLAM HYDROCHLORIDE 1 MG/ML
INJECTION INTRAMUSCULAR; INTRAVENOUS PRN
Status: DISCONTINUED | OUTPATIENT
Start: 2023-05-20 | End: 2023-05-20 | Stop reason: HOSPADM

## 2023-05-20 RX ORDER — AMLODIPINE BESYLATE 5 MG/1
5 TABLET ORAL DAILY
Qty: 30 TABLET | Refills: 3 | Status: SHIPPED | OUTPATIENT
Start: 2023-05-21

## 2023-05-20 RX ADMIN — SODIUM CHLORIDE, PRESERVATIVE FREE 10 ML: 5 INJECTION INTRAVENOUS at 08:22

## 2023-05-20 RX ADMIN — ASPIRIN 81 MG 81 MG: 81 TABLET ORAL at 08:18

## 2023-05-20 RX ADMIN — AMLODIPINE BESYLATE 5 MG: 5 TABLET ORAL at 10:24

## 2023-05-20 RX ADMIN — LEVOTHYROXINE SODIUM 88 MCG: 0.09 TABLET ORAL at 06:40

## 2023-05-20 RX ADMIN — SODIUM CHLORIDE: 9 INJECTION, SOLUTION INTRAVENOUS at 00:20

## 2023-05-20 ASSESSMENT — PAIN SCALES - GENERAL: PAINLEVEL_OUTOF10: 0

## 2023-05-20 NOTE — PROGRESS NOTES
It is with great whitley we pray for your family today: \"Because you dared to believe,    Your misti has healed you. Go with peace in your heart,    And be free from your suffering! \"    Emma Gasmen,    I believe that if you would touch my body I shall be healed. Give me the misti to come boldly into your presence.     Amen
Radial compression band removed at 1400 after slowly reducing air from 12 cc to zero as per hospital protocol. No bleeding or hematoma noted. 2 x 2 gauze with tegaderm placed over puncture site. The affected extremity is warm and dry to the touch. Frequent vital signs printed and placed on bedside chart. Patient instructed to call if any bleeding noted on gauze. Patient verbalized understanding the nursing instructions.
Reinforced instructions regarding NPO. Nothing to eat or drink after MN. NPO sign posted on door frame, next to room number.
TRANSFER - IN REPORT:    Verbal report received from Francesca Etienne RN on Leanne Cutter  being received from ED for routine progression of patient care      Report consisted of patient's Situation, Background, Assessment and   Recommendations(SBAR). Information from the following report(s) Nurse Handoff Report was reviewed with the receiving nurse. Opportunity for questions and clarification was provided. Assessment completed upon patient's arrival to unit and care assumed. Dual skin assessment completed on admission with Edvin De La Garza RN. Sacrum & heels c/d/I. Some scattered bruising & scars. Otherwise no impairment visualized.
TRANSFER - OUT REPORT:    Verbal report given to RN on José Manuel Portillo  being transferred to [id number] for routine progression of patient care       Report consisted of patient's Situation, Background, Assessment and   Recommendations(SBAR). Information from the following report(s) Nurse Handoff Report was reviewed with the receiving nurse. [place] Assessment: Presents to emergency department  because of falls (Syncope, seizure, or loss of consciousness): No, Age > 79: No, Altered Mental Status, Intoxication with alcohol or substance confusion (Disorientation, impaired judgment, poor safety awaremess, or inability to follow instructions): No, Impaired Mobility: Ambulates or transfers with assistive devices or assistance; Unable to ambulate or transer.: No, Nursing Judgement: No  Lines:   Peripheral IV 05/19/23 Right Antecubital (Active)   Site Assessment Clean, dry & intact 05/20/23 0130   Line Status Infusing 05/20/23 0130   Line Care Connections checked and tightened 05/20/23 0130   Phlebitis Assessment No symptoms 05/20/23 0130   Infiltration Assessment 0 05/20/23 0130   Alcohol Cap Used Yes 05/20/23 0130   Dressing Status Clean, dry & intact 05/20/23 0130   Dressing Type Transparent 05/20/23 0130        Opportunity for questions and clarification was provided.       Patient transported with:  Registered Nurse    REG Mayes  Diagnostic  RRA - 12    2 mg Versed  5000 units Heparin
Teaching and instructions regarding NPO. Nothing to eat or drink after MN. Patient verbalized understanding. No further questions, request or complaints when asked.
60 mg Oral Daily    valsartan (DIOVAN) tablet 160 mg  160 mg Oral Daily    zolpidem (AMBIEN) tablet 5 mg  5 mg Oral Nightly PRN    sodium chloride flush 0.9 % injection 5-40 mL  5-40 mL IntraVENous 2 times per day    sodium chloride flush 0.9 % injection 5-40 mL  5-40 mL IntraVENous PRN    0.9 % sodium chloride infusion   IntraVENous PRN    ondansetron (ZOFRAN-ODT) disintegrating tablet 4 mg  4 mg Oral Q8H PRN    Or    ondansetron (ZOFRAN) injection 4 mg  4 mg IntraVENous Q6H PRN    polyethylene glycol (GLYCOLAX) packet 17 g  17 g Oral Daily PRN    acetaminophen (TYLENOL) tablet 650 mg  650 mg Oral Q6H PRN    Or    acetaminophen (TYLENOL) suppository 650 mg  650 mg Rectal Q6H PRN    aspirin chewable tablet 81 mg  81 mg Oral Daily    nitroGLYCERIN (NITROSTAT) SL tablet 0.4 mg  0.4 mg SubLINGual Q5 Min PRN    0.9 % sodium chloride infusion   IntraVENous Continuous    magnesium oxide (MAG-OX) tablet 400 mg  400 mg Oral Daily       Data Review: data included in this note has been independently reviewed by the author     TELEMETRY: Normal sinus rhythm    Assessment/Plan:     Patient Active Problem List   Diagnosis    Hypothyroidism    Hypertension    Chest pain     PLAN  New onset angina  Plan for heart cath today risk-benefit and alternatives have been discussed        Melanie Lo MD

## 2023-05-20 NOTE — DISCHARGE SUMMARY
97.5 °F (36.4 °C) (Oral)   Resp 18   Ht 5' 8\" (1.727 m)   Wt 202 lb (91.6 kg)   SpO2 100%   BMI 30.71 kg/m²     ROS:  reviewed and verified  Physical Exam:    Physical Examination: General appearance - alert, well appearing, and in no distress  Chest/CV - clear to auscultation, no wheezes, rales or rhonchi, symmetric air entry  Heart - normal rate, regular rhythm, normal S1, S2, no murmurs, rubs, clicks or gallops  Abdomen/GI - soft, nontender, nondistended, no masses or organomegaly  Extremities - peripheral pulses normal, no pedal edema, no clubbing or cyanosis      Current Facility-Administered Medications   Medication Dose Route Frequency    amLODIPine (NORVASC) tablet 5 mg  5 mg Oral Daily    rosuvastatin (CRESTOR) tablet 20 mg  20 mg Oral Nightly    albuterol sulfate HFA (PROVENTIL;VENTOLIN;PROAIR) 108 (90 Base) MCG/ACT inhaler 2 puff  2 puff Inhalation Q4H PRN    clopidogrel (PLAVIX) tablet 75 mg  75 mg Oral Daily    cyclobenzaprine (FLEXERIL) tablet 10 mg  10 mg Oral TID PRN    levothyroxine (SYNTHROID) tablet 88 mcg  88 mcg Oral Daily    raloxifene (EVISTA) tablet 60 mg (Patient Supplied)  60 mg Oral Daily    valsartan (DIOVAN) tablet 160 mg  160 mg Oral Daily    zolpidem (AMBIEN) tablet 5 mg  5 mg Oral Nightly PRN    sodium chloride flush 0.9 % injection 5-40 mL  5-40 mL IntraVENous 2 times per day    sodium chloride flush 0.9 % injection 5-40 mL  5-40 mL IntraVENous PRN    0.9 % sodium chloride infusion   IntraVENous PRN    ondansetron (ZOFRAN-ODT) disintegrating tablet 4 mg  4 mg Oral Q8H PRN    Or    ondansetron (ZOFRAN) injection 4 mg  4 mg IntraVENous Q6H PRN    polyethylene glycol (GLYCOLAX) packet 17 g  17 g Oral Daily PRN    acetaminophen (TYLENOL) tablet 650 mg  650 mg Oral Q6H PRN    Or    acetaminophen (TYLENOL) suppository 650 mg  650 mg Rectal Q6H PRN    aspirin chewable tablet 81 mg  81 mg Oral Daily    nitroGLYCERIN (NITROSTAT) SL tablet 0.4 mg  0.4 mg SubLINGual Q5 Min PRN    0.9 %

## 2023-05-20 NOTE — CARE COORDINATION
Discharge order is in. Pt underwent cardiac catheterization by Dr. Za Gleason. Pt tolerated the procedure well and is now discharging home in stable condition. No discharge needs identified. Tx goals met.     05/20/23 1119   Service Assessment   Patient Orientation Alert and Oriented   Cognition Alert   History Provided By Patient   Primary Caregiver Self   Support Systems Children;Family Members;[facility];Friends/Neighbors   PCP Verified by CM Yes  ([facility])   Prior Functional Level Independent in ADLs/IADLs   Current Functional Level Independent in ADLs/IADLs   Can patient return to prior living arrangement Yes   Ability to make needs known: Good   Family able to assist with home care needs: Yes   Would you like for me to discuss the discharge plan with any other family members/significant others, and if so, who? No   Financial Resources Medicare   Community Resources None   Social/Functional History   Lives With Family   Type of [address] [facility] One level   [address] [facility] [place][note 2] Help From Family   ADL Assistance Independent   Ambulation Assistance Independent   Transfer Assistance Independent   Active  Yes   Mode of Transportation Car   Occupation Retired   Discharge Planning   Type of [address] Prior To Admission None   Potential Assistance Needed N/A   DME Ordered? No   Potential Assistance Purchasing Medications No   Type of Home Care Services None   Patient expects to be discharged to: [address] Discharge   Transition of Care Consult (CM Consult) Discharge [facility] Discharge None   [facility] Information Provided? No   Mode of Transport at Discharge Other (see comment)  (Family)   Confirm Follow Up Transport Family   Condition of Participation: Discharge Planning   The Patient and/or Patient Representative was provided with a Choice of Provider?

## 2023-05-23 ENCOUNTER — TELEPHONE (OUTPATIENT)
Age: 73
End: 2023-05-23

## 2023-05-23 NOTE — TELEPHONE ENCOUNTER
----- Message from Maco Roe sent at 5/22/2023 10:42 AM EDT -----    ----- Message -----  From: KALEB Lemus  Sent: 5/20/2023  10:18 AM EDT  To: , #    Admitted for chest pain  Underwent diagnostic heart cath   Needs to follow up in 2-4 weeks   Thank you

## 2023-10-25 ENCOUNTER — APPOINTMENT (RX ONLY)
Dept: URBAN - METROPOLITAN AREA CLINIC 329 | Facility: CLINIC | Age: 73
Setting detail: DERMATOLOGY
End: 2023-10-25

## 2023-10-25 DIAGNOSIS — L81.4 OTHER MELANIN HYPERPIGMENTATION: ICD-10-CM | Status: STABLE

## 2023-10-25 DIAGNOSIS — D18.0 HEMANGIOMA: ICD-10-CM | Status: STABLE

## 2023-10-25 DIAGNOSIS — D22 MELANOCYTIC NEVI: ICD-10-CM | Status: STABLE

## 2023-10-25 DIAGNOSIS — L85.3 XEROSIS CUTIS: ICD-10-CM

## 2023-10-25 DIAGNOSIS — L82.1 OTHER SEBORRHEIC KERATOSIS: ICD-10-CM | Status: STABLE

## 2023-10-25 DIAGNOSIS — D485 NEOPLASM OF UNCERTAIN BEHAVIOR OF SKIN: ICD-10-CM | Status: INADEQUATELY CONTROLLED

## 2023-10-25 PROBLEM — D18.01 HEMANGIOMA OF SKIN AND SUBCUTANEOUS TISSUE: Status: ACTIVE | Noted: 2023-10-25

## 2023-10-25 PROBLEM — D22.5 MELANOCYTIC NEVI OF TRUNK: Status: ACTIVE | Noted: 2023-10-25

## 2023-10-25 PROBLEM — D48.5 NEOPLASM OF UNCERTAIN BEHAVIOR OF SKIN: Status: ACTIVE | Noted: 2023-10-25

## 2023-10-25 PROCEDURE — 99203 OFFICE O/P NEW LOW 30 MIN: CPT | Mod: 25

## 2023-10-25 PROCEDURE — ? COUNSELING

## 2023-10-25 PROCEDURE — ? BIOPSY BY SHAVE METHOD

## 2023-10-25 PROCEDURE — ? FULL BODY SKIN EXAM

## 2023-10-25 PROCEDURE — 11102 TANGNTL BX SKIN SINGLE LES: CPT

## 2023-10-25 PROCEDURE — ? SUNSCREEN RECOMMENDATIONS

## 2023-10-25 ASSESSMENT — LOCATION ZONE DERM: LOCATION ZONE: TRUNK

## 2023-10-25 ASSESSMENT — LOCATION DETAILED DESCRIPTION DERM
LOCATION DETAILED: RIGHT MEDIAL BREAST 1-2:00 REGION
LOCATION DETAILED: RIGHT SUPERIOR MEDIAL UPPER BACK
LOCATION DETAILED: SUPERIOR THORACIC SPINE
LOCATION DETAILED: RIGHT SUPERIOR UPPER BACK
LOCATION DETAILED: INFERIOR THORACIC SPINE
LOCATION DETAILED: RIGHT MEDIAL SUPERIOR CHEST
LOCATION DETAILED: EPIGASTRIC SKIN

## 2023-10-25 ASSESSMENT — LOCATION SIMPLE DESCRIPTION DERM
LOCATION SIMPLE: RIGHT BREAST
LOCATION SIMPLE: CHEST
LOCATION SIMPLE: UPPER BACK
LOCATION SIMPLE: ABDOMEN
LOCATION SIMPLE: RIGHT UPPER BACK

## 2023-10-25 NOTE — HPI: EVALUATION OF SKIN LESION(S)
Hpi Title: Evaluation of Skin Lesions
Additional History: Pt has rough lesion on her side that has flaked off. Pt has rosacea on her nose. She was given an oral medication for the rosacea.

## 2024-04-25 ENCOUNTER — OFFICE VISIT (OUTPATIENT)
Age: 74
End: 2024-04-25
Payer: MEDICARE

## 2024-04-25 VITALS
DIASTOLIC BLOOD PRESSURE: 62 MMHG | WEIGHT: 209 LBS | HEIGHT: 68 IN | HEART RATE: 73 BPM | BODY MASS INDEX: 31.67 KG/M2 | SYSTOLIC BLOOD PRESSURE: 108 MMHG

## 2024-04-25 DIAGNOSIS — I10 ESSENTIAL HYPERTENSION: ICD-10-CM

## 2024-04-25 DIAGNOSIS — I25.10 CORONARY ARTERY DISEASE INVOLVING NATIVE CORONARY ARTERY OF NATIVE HEART WITHOUT ANGINA PECTORIS: Primary | ICD-10-CM

## 2024-04-25 DIAGNOSIS — E78.2 MIXED HYPERLIPIDEMIA: ICD-10-CM

## 2024-04-25 PROCEDURE — G8427 DOCREV CUR MEDS BY ELIG CLIN: HCPCS | Performed by: INTERNAL MEDICINE

## 2024-04-25 PROCEDURE — G8417 CALC BMI ABV UP PARAM F/U: HCPCS | Performed by: INTERNAL MEDICINE

## 2024-04-25 PROCEDURE — 3078F DIAST BP <80 MM HG: CPT | Performed by: INTERNAL MEDICINE

## 2024-04-25 PROCEDURE — 1090F PRES/ABSN URINE INCON ASSESS: CPT | Performed by: INTERNAL MEDICINE

## 2024-04-25 PROCEDURE — G8399 PT W/DXA RESULTS DOCUMENT: HCPCS | Performed by: INTERNAL MEDICINE

## 2024-04-25 PROCEDURE — 93000 ELECTROCARDIOGRAM COMPLETE: CPT | Performed by: INTERNAL MEDICINE

## 2024-04-25 PROCEDURE — 99214 OFFICE O/P EST MOD 30 MIN: CPT | Performed by: INTERNAL MEDICINE

## 2024-04-25 PROCEDURE — 1123F ACP DISCUSS/DSCN MKR DOCD: CPT | Performed by: INTERNAL MEDICINE

## 2024-04-25 PROCEDURE — 1036F TOBACCO NON-USER: CPT | Performed by: INTERNAL MEDICINE

## 2024-04-25 PROCEDURE — 3074F SYST BP LT 130 MM HG: CPT | Performed by: INTERNAL MEDICINE

## 2024-04-25 PROCEDURE — 3017F COLORECTAL CA SCREEN DOC REV: CPT | Performed by: INTERNAL MEDICINE

## 2024-04-25 RX ORDER — PHENOL 1.4 %
1 AEROSOL, SPRAY (ML) MUCOUS MEMBRANE DAILY
COMMUNITY

## 2024-04-25 RX ORDER — FLUTICASONE FUROATE, UMECLIDINIUM BROMIDE AND VILANTEROL TRIFENATATE 100; 62.5; 25 UG/1; UG/1; UG/1
1 POWDER RESPIRATORY (INHALATION) DAILY
COMMUNITY

## 2024-04-25 ASSESSMENT — ENCOUNTER SYMPTOMS
EYE REDNESS: 0
HOARSE VOICE: 0
ABDOMINAL PAIN: 0
WHEEZING: 0
HEMATOCHEZIA: 0
DOUBLE VISION: 0
HEMATEMESIS: 0
STRIDOR: 0
HEMOPTYSIS: 0

## 2024-04-25 NOTE — PROGRESS NOTES
[facility] CARDIOLOGY  [address] [place] [address], [address]  [place] [address]  PHONE: [phone]          24    NAME:  Petra Pascal  : 1950  MRN: [id number]         SUBJECTIVE:   Petra Pascal is a 73 y.o. female seen for a visit regarding the following:     Chief Complaint   Patient presents with    Coronary Artery Disease    Hypertension    Hyperlipidemia           HPI:    Cardio problem list:  1.  Coronary artery disease  -[facility]-2023-EF 65 to 70%, EDP 19, moderate OM1 disease, other vessels normal- Dr Mayes  2.  Hypertension  3.  Hyperlipidemia  4. H/O CVA 2017- transient left sided weakness  5.  Prediabetes    Dear Dr. Collado,  I saw Ms. Pascal who is a pleasant 73-year-old woman in cardiovascular consultation for coronary artery disease, history of CVA with known underlying hypertension and hyperlipidemia.    CAD: No complaints of any angina at this point.  Had some significant chest pain back in May 2023-central substernal, radiating to her neck and between her shoulder blades associated with significant dyspnea.  Coronary angiography at that point showed nonobstructive disease-moderate 60 to 70% first obtuse marginal branch disease.  She was placed on appropriate antiplatelet therapy with statin therapy and has done very well since this was done close to a year ago.  No complaints of any additional angina.  She remains very active and works in her garden every day-has 2 acres and maintains this with no significant limitations.    Hypertension: Denies headaches blurry vision remains compliant on current therapy.    Hyperlipidemia-remains on high intensity statin therapy with no significant myalgias.    History of CVA-had transient left-sided weakness when she was [place] ship [place] in -had extensive workup and fortunately she has done very well since then.  Has some mild residual left foot weakness and a foot drop to some extent but has been able to work with

## 2024-10-29 ENCOUNTER — APPOINTMENT (RX ONLY)
Dept: URBAN - METROPOLITAN AREA CLINIC 329 | Facility: CLINIC | Age: 74
Setting detail: DERMATOLOGY
End: 2024-10-29

## 2024-10-29 DIAGNOSIS — L57.0 ACTINIC KERATOSIS: ICD-10-CM

## 2024-10-29 DIAGNOSIS — L81.4 OTHER MELANIN HYPERPIGMENTATION: ICD-10-CM

## 2024-10-29 DIAGNOSIS — L82.1 OTHER SEBORRHEIC KERATOSIS: ICD-10-CM

## 2024-10-29 DIAGNOSIS — D18.0 HEMANGIOMA: ICD-10-CM

## 2024-10-29 DIAGNOSIS — L85.3 XEROSIS CUTIS: ICD-10-CM

## 2024-10-29 DIAGNOSIS — D22 MELANOCYTIC NEVI: ICD-10-CM

## 2024-10-29 PROBLEM — D18.01 HEMANGIOMA OF SKIN AND SUBCUTANEOUS TISSUE: Status: ACTIVE | Noted: 2024-10-29

## 2024-10-29 PROBLEM — D22.5 MELANOCYTIC NEVI OF TRUNK: Status: ACTIVE | Noted: 2024-10-29

## 2024-10-29 PROCEDURE — 17000 DESTRUCT PREMALG LESION: CPT

## 2024-10-29 PROCEDURE — 17003 DESTRUCT PREMALG LES 2-14: CPT

## 2024-10-29 PROCEDURE — ? SUNSCREEN RECOMMENDATIONS

## 2024-10-29 PROCEDURE — ? COUNSELING

## 2024-10-29 PROCEDURE — ? LIQUID NITROGEN

## 2024-10-29 PROCEDURE — 99213 OFFICE O/P EST LOW 20 MIN: CPT | Mod: 25

## 2024-10-29 ASSESSMENT — LOCATION DETAILED DESCRIPTION DERM
LOCATION DETAILED: EPIGASTRIC SKIN
LOCATION DETAILED: INFERIOR THORACIC SPINE
LOCATION DETAILED: LEFT FOREHEAD
LOCATION DETAILED: RIGHT DORSAL WRIST
LOCATION DETAILED: RIGHT MEDIAL BREAST 1-2:00 REGION
LOCATION DETAILED: RIGHT SUPERIOR MEDIAL UPPER BACK
LOCATION DETAILED: SUPERIOR THORACIC SPINE
LOCATION DETAILED: RIGHT SUPERIOR UPPER BACK

## 2024-10-29 ASSESSMENT — LOCATION SIMPLE DESCRIPTION DERM
LOCATION SIMPLE: RIGHT UPPER BACK
LOCATION SIMPLE: UPPER BACK
LOCATION SIMPLE: RIGHT BREAST
LOCATION SIMPLE: LEFT FOREHEAD
LOCATION SIMPLE: RIGHT WRIST
LOCATION SIMPLE: ABDOMEN

## 2024-10-29 ASSESSMENT — LOCATION ZONE DERM
LOCATION ZONE: ARM
LOCATION ZONE: TRUNK
LOCATION ZONE: FACE

## 2024-10-29 NOTE — PROCEDURE: LIQUID NITROGEN
Detail Level: Detailed
Render Post-Care Instructions In Note?: no
Duration Of Freeze Thaw-Cycle (Seconds): 0
Post-Care Instructions: I reviewed with the patient in detail post-care instructions. Patient is to wear sunprotection, and avoid picking at any of the treated lesions. Pt may apply Vaseline to crusted or scabbing areas.
Show Aperture Variable?: Yes
Consent: The patient's consent was obtained including but not limited to risks of crusting, scabbing, blistering, scarring, darker or lighter pigmentary change, recurrence, incomplete removal and infection.
oriented to person, place, time and situation

## 2024-12-26 NOTE — PROCEDURE: COUNSELING
Detail Level: Generalized
Patient Specific Counseling (Will Not Stick From Patient To Patient): Cerave moisturizer or Lipikar AP Balm
Detail Level: Detailed
yes

## 2025-03-13 ENCOUNTER — TRANSCRIBE ORDERS (OUTPATIENT)
Dept: SCHEDULING | Age: 75
End: 2025-03-13

## 2025-03-13 DIAGNOSIS — Z12.31 OTHER SCREENING MAMMOGRAM: Primary | ICD-10-CM

## 2025-05-06 ENCOUNTER — APPOINTMENT (OUTPATIENT)
Dept: URBAN - METROPOLITAN AREA CLINIC 329 | Facility: CLINIC | Age: 75
Setting detail: DERMATOLOGY
End: 2025-05-06

## 2025-05-06 DIAGNOSIS — L81.4 OTHER MELANIN HYPERPIGMENTATION: ICD-10-CM | Status: INADEQUATELY CONTROLLED

## 2025-05-06 DIAGNOSIS — L57.0 ACTINIC KERATOSIS: ICD-10-CM | Status: INADEQUATELY CONTROLLED

## 2025-05-06 PROCEDURE — ? SUNSCREEN RECOMMENDATIONS

## 2025-05-06 PROCEDURE — ? PRESCRIPTION MEDICATION MANAGEMENT

## 2025-05-06 PROCEDURE — ? FULL BODY SKIN EXAM - DECLINED

## 2025-05-06 PROCEDURE — ? LIQUID NITROGEN

## 2025-05-06 PROCEDURE — 17000 DESTRUCT PREMALG LESION: CPT

## 2025-05-06 PROCEDURE — 17003 DESTRUCT PREMALG LES 2-14: CPT

## 2025-05-06 PROCEDURE — ? COUNSELING

## 2025-05-06 PROCEDURE — 99212 OFFICE O/P EST SF 10 MIN: CPT | Mod: 25

## 2025-05-06 ASSESSMENT — LOCATION SIMPLE DESCRIPTION DERM
LOCATION SIMPLE: NOSE
LOCATION SIMPLE: LEFT NOSE
LOCATION SIMPLE: RIGHT CHEEK

## 2025-05-06 ASSESSMENT — LOCATION ZONE DERM
LOCATION ZONE: NOSE
LOCATION ZONE: FACE

## 2025-05-06 ASSESSMENT — LOCATION DETAILED DESCRIPTION DERM
LOCATION DETAILED: LEFT NASAL ALA
LOCATION DETAILED: RIGHT INFERIOR MEDIAL MALAR CHEEK
LOCATION DETAILED: NASAL DORSUM

## 2025-05-06 NOTE — PROCEDURE: PRESCRIPTION MEDICATION MANAGEMENT
Render In Strict Bullet Format?: No
Plan: Patient to use sunscreen and re apply it every 2 hours. Patient to use Vanicream sunscreen
Detail Level: Zone
Father  Still living? No  Family history of hypertension, Age at diagnosis: Age Unknown     Uncle  Still living? No  Family history of hypertension, Age at diagnosis: Age Unknown

## (undated) DEVICE — CATHETER DIAG AD 5FR L110CM 145DEG COR GRY HYDRPHLC NYL

## (undated) DEVICE — GLIDESHEATH SLENDER STAINLESS STEEL KIT: Brand: GLIDESHEATH SLENDER

## (undated) DEVICE — GUIDEWIRE VASC L260CM DIA0.035IN RAD 3MM J TIP L7CM PTFE

## (undated) DEVICE — CATHETER COR DIAG 4.0 5FR 110CM 2 SIDE H

## (undated) DEVICE — BAND COMPR L24CM REG CLR PLAS HEMSTAT EXT HK AND LOOP RETEN